# Patient Record
Sex: MALE | Race: WHITE | NOT HISPANIC OR LATINO | Employment: FULL TIME | URBAN - METROPOLITAN AREA
[De-identification: names, ages, dates, MRNs, and addresses within clinical notes are randomized per-mention and may not be internally consistent; named-entity substitution may affect disease eponyms.]

---

## 2021-02-24 ENCOUNTER — HOSPITAL ENCOUNTER (INPATIENT)
Facility: HOSPITAL | Age: 61
LOS: 1 days | Discharge: HOME | End: 2021-02-25
Attending: INTERNAL MEDICINE | Admitting: INTERNAL MEDICINE

## 2021-02-24 ENCOUNTER — APPOINTMENT (OUTPATIENT)
Dept: GENERAL RADIOLOGY | Facility: HOSPITAL | Age: 61
End: 2021-02-24

## 2021-02-24 ENCOUNTER — TRANSCRIBE ORDERS (OUTPATIENT)
Dept: CARDIOLOGY | Facility: CLINIC | Age: 61
End: 2021-02-24

## 2021-02-24 DIAGNOSIS — R94.39 ABNORMAL STRESS TEST: Primary | ICD-10-CM

## 2021-02-24 PROBLEM — K21.9 GERD (GASTROESOPHAGEAL REFLUX DISEASE): Status: ACTIVE | Noted: 2021-02-24

## 2021-02-24 PROBLEM — I20.0 UNSTABLE ANGINA (HCC): Status: ACTIVE | Noted: 2021-02-24

## 2021-02-24 PROBLEM — G47.30 SLEEP APNEA: Status: ACTIVE | Noted: 2021-02-24

## 2021-02-24 PROBLEM — I44.7 LBBB (LEFT BUNDLE BRANCH BLOCK): Status: ACTIVE | Noted: 2021-02-24

## 2021-02-24 PROBLEM — E11.9 TYPE 2 DIABETES MELLITUS: Status: ACTIVE | Noted: 2021-02-24

## 2021-02-24 PROBLEM — E78.5 HYPERLIPIDEMIA: Status: ACTIVE | Noted: 2021-02-24

## 2021-02-24 LAB
ALBUMIN SERPL-MCNC: 4.1 G/DL (ref 3.5–5.2)
ALBUMIN/GLOB SERPL: 1.6 G/DL
ALP SERPL-CCNC: 73 U/L (ref 39–117)
ALT SERPL W P-5'-P-CCNC: 37 U/L (ref 1–41)
ANION GAP SERPL CALCULATED.3IONS-SCNC: 9 MMOL/L (ref 5–15)
AST SERPL-CCNC: 25 U/L (ref 1–40)
BASOPHILS # BLD AUTO: 0.05 10*3/MM3 (ref 0–0.2)
BASOPHILS NFR BLD AUTO: 0.8 % (ref 0–1.5)
BILIRUB SERPL-MCNC: 0.7 MG/DL (ref 0–1.2)
BUN SERPL-MCNC: 19 MG/DL (ref 6–20)
BUN/CREAT SERPL: 22.6 (ref 7–25)
CALCIUM SPEC-SCNC: 10.1 MG/DL (ref 8.6–10.5)
CHLORIDE SERPL-SCNC: 98 MMOL/L (ref 98–107)
CO2 SERPL-SCNC: 28 MMOL/L (ref 22–29)
CREAT SERPL-MCNC: 0.84 MG/DL (ref 0.76–1.27)
DEPRECATED RDW RBC AUTO: 40.5 FL (ref 37–54)
EOSINOPHIL # BLD AUTO: 0.11 10*3/MM3 (ref 0–0.4)
EOSINOPHIL NFR BLD AUTO: 1.8 % (ref 0.3–6.2)
ERYTHROCYTE [DISTWIDTH] IN BLOOD BY AUTOMATED COUNT: 13 % (ref 12.3–15.4)
GFR SERPL CREATININE-BSD FRML MDRD: 114 ML/MIN/1.73
GFR SERPL CREATININE-BSD FRML MDRD: 94 ML/MIN/1.73
GLOBULIN UR ELPH-MCNC: 2.5 GM/DL
GLUCOSE SERPL-MCNC: 158 MG/DL (ref 65–99)
HCT VFR BLD AUTO: 47.4 % (ref 37.5–51)
HGB BLD-MCNC: 16.3 G/DL (ref 13–17.7)
IMM GRANULOCYTES # BLD AUTO: 0.01 10*3/MM3 (ref 0–0.05)
IMM GRANULOCYTES NFR BLD AUTO: 0.2 % (ref 0–0.5)
LYMPHOCYTES # BLD AUTO: 2.26 10*3/MM3 (ref 0.7–3.1)
LYMPHOCYTES NFR BLD AUTO: 37.9 % (ref 19.6–45.3)
MAGNESIUM SERPL-MCNC: 1.6 MG/DL (ref 1.6–2.6)
MCH RBC QN AUTO: 29.6 PG (ref 26.6–33)
MCHC RBC AUTO-ENTMCNC: 34.4 G/DL (ref 31.5–35.7)
MCV RBC AUTO: 86.2 FL (ref 79–97)
MONOCYTES # BLD AUTO: 0.65 10*3/MM3 (ref 0.1–0.9)
MONOCYTES NFR BLD AUTO: 10.9 % (ref 5–12)
NEUTROPHILS NFR BLD AUTO: 2.88 10*3/MM3 (ref 1.7–7)
NEUTROPHILS NFR BLD AUTO: 48.4 % (ref 42.7–76)
NRBC BLD AUTO-RTO: 0 /100 WBC (ref 0–0.2)
NT-PROBNP SERPL-MCNC: 63.6 PG/ML (ref 0–900)
PLATELET # BLD AUTO: 178 10*3/MM3 (ref 140–450)
PMV BLD AUTO: 11.2 FL (ref 6–12)
POTASSIUM SERPL-SCNC: 3.3 MMOL/L (ref 3.5–5.2)
PROT SERPL-MCNC: 6.6 G/DL (ref 6–8.5)
RBC # BLD AUTO: 5.5 10*6/MM3 (ref 4.14–5.8)
SODIUM SERPL-SCNC: 135 MMOL/L (ref 136–145)
TROPONIN T SERPL-MCNC: <0.01 NG/ML (ref 0–0.03)
TROPONIN T SERPL-MCNC: <0.01 NG/ML (ref 0–0.03)
UFH PPP CHRO-ACNC: 0.1 IU/ML (ref 0.3–0.7)
WBC # BLD AUTO: 5.96 10*3/MM3 (ref 3.4–10.8)

## 2021-02-24 PROCEDURE — 80053 COMPREHEN METABOLIC PANEL: CPT | Performed by: NURSE PRACTITIONER

## 2021-02-24 PROCEDURE — 71045 X-RAY EXAM CHEST 1 VIEW: CPT

## 2021-02-24 PROCEDURE — 99223 1ST HOSP IP/OBS HIGH 75: CPT | Performed by: INTERNAL MEDICINE

## 2021-02-24 PROCEDURE — 93010 ELECTROCARDIOGRAM REPORT: CPT | Performed by: INTERNAL MEDICINE

## 2021-02-24 PROCEDURE — 94660 CPAP INITIATION&MGMT: CPT

## 2021-02-24 PROCEDURE — 85025 COMPLETE CBC W/AUTO DIFF WBC: CPT | Performed by: NURSE PRACTITIONER

## 2021-02-24 PROCEDURE — 93005 ELECTROCARDIOGRAM TRACING: CPT | Performed by: NURSE PRACTITIONER

## 2021-02-24 PROCEDURE — 83735 ASSAY OF MAGNESIUM: CPT | Performed by: NURSE PRACTITIONER

## 2021-02-24 PROCEDURE — 84484 ASSAY OF TROPONIN QUANT: CPT | Performed by: NURSE PRACTITIONER

## 2021-02-24 PROCEDURE — 83880 ASSAY OF NATRIURETIC PEPTIDE: CPT | Performed by: NURSE PRACTITIONER

## 2021-02-24 PROCEDURE — 85520 HEPARIN ASSAY: CPT

## 2021-02-24 PROCEDURE — 94799 UNLISTED PULMONARY SVC/PX: CPT

## 2021-02-24 PROCEDURE — 25010000002 HEPARIN (PORCINE) PER 1000 UNITS

## 2021-02-24 RX ORDER — POTASSIUM CHLORIDE 750 MG/1
40 CAPSULE, EXTENDED RELEASE ORAL AS NEEDED
Status: DISCONTINUED | OUTPATIENT
Start: 2021-02-24 | End: 2021-02-25 | Stop reason: HOSPADM

## 2021-02-24 RX ORDER — ATORVASTATIN CALCIUM 20 MG/1
20 TABLET, FILM COATED ORAL DAILY
Status: DISCONTINUED | OUTPATIENT
Start: 2021-02-25 | End: 2021-02-25 | Stop reason: HOSPADM

## 2021-02-24 RX ORDER — MELOXICAM 15 MG/1
15 TABLET ORAL DAILY
COMMUNITY

## 2021-02-24 RX ORDER — HEPARIN SODIUM 10000 [USP'U]/100ML
12 INJECTION, SOLUTION INTRAVENOUS
Status: DISCONTINUED | OUTPATIENT
Start: 2021-02-24 | End: 2021-02-25

## 2021-02-24 RX ORDER — ACETAMINOPHEN 650 MG/1
650 SUPPOSITORY RECTAL EVERY 4 HOURS PRN
Status: DISCONTINUED | OUTPATIENT
Start: 2021-02-24 | End: 2021-02-25 | Stop reason: HOSPADM

## 2021-02-24 RX ORDER — MAGNESIUM SULFATE HEPTAHYDRATE 40 MG/ML
2 INJECTION, SOLUTION INTRAVENOUS AS NEEDED
Status: DISCONTINUED | OUTPATIENT
Start: 2021-02-24 | End: 2021-02-25 | Stop reason: HOSPADM

## 2021-02-24 RX ORDER — METOPROLOL SUCCINATE 50 MG/1
50 TABLET, EXTENDED RELEASE ORAL DAILY
COMMUNITY
End: 2023-03-22 | Stop reason: ALTCHOICE

## 2021-02-24 RX ORDER — ACETAMINOPHEN 325 MG/1
650 TABLET ORAL EVERY 4 HOURS PRN
Status: DISCONTINUED | OUTPATIENT
Start: 2021-02-24 | End: 2021-02-25 | Stop reason: HOSPADM

## 2021-02-24 RX ORDER — GLIPIZIDE 5 MG/1
5 TABLET ORAL
COMMUNITY

## 2021-02-24 RX ORDER — POTASSIUM CHLORIDE 1.5 G/1.77G
40 POWDER, FOR SOLUTION ORAL AS NEEDED
Status: DISCONTINUED | OUTPATIENT
Start: 2021-02-24 | End: 2021-02-25 | Stop reason: HOSPADM

## 2021-02-24 RX ORDER — NITROGLYCERIN 0.4 MG/1
0.4 TABLET SUBLINGUAL
Status: DISCONTINUED | OUTPATIENT
Start: 2021-02-24 | End: 2021-02-25 | Stop reason: HOSPADM

## 2021-02-24 RX ORDER — DEXTROSE MONOHYDRATE 25 G/50ML
25 INJECTION, SOLUTION INTRAVENOUS
Status: DISCONTINUED | OUTPATIENT
Start: 2021-02-24 | End: 2021-02-25 | Stop reason: HOSPADM

## 2021-02-24 RX ORDER — ACETAMINOPHEN 160 MG/5ML
650 SOLUTION ORAL EVERY 4 HOURS PRN
Status: DISCONTINUED | OUTPATIENT
Start: 2021-02-24 | End: 2021-02-25 | Stop reason: HOSPADM

## 2021-02-24 RX ORDER — POTASSIUM CHLORIDE 7.45 MG/ML
10 INJECTION INTRAVENOUS
Status: DISCONTINUED | OUTPATIENT
Start: 2021-02-24 | End: 2021-02-25 | Stop reason: HOSPADM

## 2021-02-24 RX ORDER — METOPROLOL SUCCINATE 50 MG/1
50 TABLET, EXTENDED RELEASE ORAL DAILY
Status: DISCONTINUED | OUTPATIENT
Start: 2021-02-25 | End: 2021-02-25 | Stop reason: HOSPADM

## 2021-02-24 RX ORDER — SODIUM CHLORIDE 0.9 % (FLUSH) 0.9 %
10 SYRINGE (ML) INJECTION EVERY 12 HOURS SCHEDULED
Status: DISCONTINUED | OUTPATIENT
Start: 2021-02-24 | End: 2021-02-25 | Stop reason: HOSPADM

## 2021-02-24 RX ORDER — NICOTINE POLACRILEX 4 MG
15 LOZENGE BUCCAL
Status: DISCONTINUED | OUTPATIENT
Start: 2021-02-24 | End: 2021-02-25 | Stop reason: HOSPADM

## 2021-02-24 RX ORDER — TADALAFIL 5 MG/1
5 TABLET ORAL DAILY PRN
COMMUNITY

## 2021-02-24 RX ORDER — PANTOPRAZOLE SODIUM 40 MG/1
40 TABLET, DELAYED RELEASE ORAL DAILY
Status: DISCONTINUED | OUTPATIENT
Start: 2021-02-25 | End: 2021-02-25 | Stop reason: HOSPADM

## 2021-02-24 RX ORDER — MAGNESIUM SULFATE HEPTAHYDRATE 40 MG/ML
4 INJECTION, SOLUTION INTRAVENOUS AS NEEDED
Status: DISCONTINUED | OUTPATIENT
Start: 2021-02-24 | End: 2021-02-25 | Stop reason: HOSPADM

## 2021-02-24 RX ORDER — SODIUM CHLORIDE 0.9 % (FLUSH) 0.9 %
10 SYRINGE (ML) INJECTION AS NEEDED
Status: DISCONTINUED | OUTPATIENT
Start: 2021-02-24 | End: 2021-02-25 | Stop reason: HOSPADM

## 2021-02-24 RX ORDER — HEPARIN SODIUM 1000 [USP'U]/ML
4000 INJECTION, SOLUTION INTRAVENOUS; SUBCUTANEOUS ONCE
Status: COMPLETED | OUTPATIENT
Start: 2021-02-24 | End: 2021-02-24

## 2021-02-24 RX ORDER — ATORVASTATIN CALCIUM 20 MG/1
20 TABLET, FILM COATED ORAL DAILY
COMMUNITY

## 2021-02-24 RX ORDER — OMEPRAZOLE 20 MG/1
20 CAPSULE, DELAYED RELEASE ORAL DAILY
COMMUNITY

## 2021-02-24 RX ADMIN — HEPARIN SODIUM 8 UNITS/KG/HR: 10000 INJECTION, SOLUTION INTRAVENOUS at 23:05

## 2021-02-24 RX ADMIN — Medication 10 ML: at 22:00

## 2021-02-24 RX ADMIN — HEPARIN SODIUM 4000 UNITS: 1000 INJECTION, SOLUTION INTRAVENOUS; SUBCUTANEOUS at 23:05

## 2021-02-25 ENCOUNTER — READMISSION MANAGEMENT (OUTPATIENT)
Dept: CALL CENTER | Facility: HOSPITAL | Age: 61
End: 2021-02-25

## 2021-02-25 VITALS
SYSTOLIC BLOOD PRESSURE: 140 MMHG | BODY MASS INDEX: 33.56 KG/M2 | WEIGHT: 275.57 LBS | HEIGHT: 76 IN | RESPIRATION RATE: 18 BRPM | TEMPERATURE: 98.7 F | HEART RATE: 78 BPM | OXYGEN SATURATION: 92 % | DIASTOLIC BLOOD PRESSURE: 78 MMHG

## 2021-02-25 PROBLEM — I44.7 LBBB (LEFT BUNDLE BRANCH BLOCK): Chronic | Status: ACTIVE | Noted: 2021-02-24

## 2021-02-25 PROBLEM — R94.39 ABNORMAL STRESS TEST: Status: RESOLVED | Noted: 2021-02-24 | Resolved: 2021-02-25

## 2021-02-25 PROBLEM — U07.1 COVID-19 VIRUS DETECTED: Status: RESOLVED | Noted: 2021-02-25 | Resolved: 2021-02-25

## 2021-02-25 PROBLEM — R07.9 CHEST PAIN WITH NORMAL CORONARY ANGIOGRAPHY: Status: ACTIVE | Noted: 2021-02-24

## 2021-02-25 PROBLEM — U07.1 COVID-19 VIRUS DETECTED: Status: ACTIVE | Noted: 2021-02-25

## 2021-02-25 PROBLEM — R55 SYNCOPE AND COLLAPSE: Status: ACTIVE | Noted: 2021-02-25

## 2021-02-25 PROBLEM — E11.9 TYPE 2 DIABETES MELLITUS: Chronic | Status: ACTIVE | Noted: 2021-02-24

## 2021-02-25 PROBLEM — E78.5 HYPERLIPIDEMIA LDL GOAL <70: Chronic | Status: ACTIVE | Noted: 2021-02-24

## 2021-02-25 LAB
ANION GAP SERPL CALCULATED.3IONS-SCNC: 10 MMOL/L (ref 5–15)
BASOPHILS # BLD AUTO: 0.05 10*3/MM3 (ref 0–0.2)
BASOPHILS NFR BLD AUTO: 1 % (ref 0–1.5)
BUN SERPL-MCNC: 18 MG/DL (ref 6–20)
BUN/CREAT SERPL: 20.5 (ref 7–25)
CALCIUM SPEC-SCNC: 9.9 MG/DL (ref 8.6–10.5)
CATH EF ESTIMATED: 55 %
CHLORIDE SERPL-SCNC: 98 MMOL/L (ref 98–107)
CHOLEST SERPL-MCNC: 116 MG/DL (ref 0–200)
CO2 SERPL-SCNC: 29 MMOL/L (ref 22–29)
CREAT SERPL-MCNC: 0.88 MG/DL (ref 0.76–1.27)
DEPRECATED RDW RBC AUTO: 41.1 FL (ref 37–54)
EOSINOPHIL # BLD AUTO: 0.09 10*3/MM3 (ref 0–0.4)
EOSINOPHIL NFR BLD AUTO: 1.8 % (ref 0.3–6.2)
ERYTHROCYTE [DISTWIDTH] IN BLOOD BY AUTOMATED COUNT: 13.2 % (ref 12.3–15.4)
FLUAV RNA RESP QL NAA+PROBE: NOT DETECTED
FLUBV RNA RESP QL NAA+PROBE: NOT DETECTED
GFR SERPL CREATININE-BSD FRML MDRD: 89 ML/MIN/1.73
GLUCOSE BLDC GLUCOMTR-MCNC: 154 MG/DL (ref 70–130)
GLUCOSE BLDC GLUCOMTR-MCNC: 169 MG/DL (ref 70–130)
GLUCOSE BLDC GLUCOMTR-MCNC: 169 MG/DL (ref 70–130)
GLUCOSE BLDC GLUCOMTR-MCNC: 203 MG/DL (ref 70–130)
GLUCOSE SERPL-MCNC: 193 MG/DL (ref 65–99)
HBA1C MFR BLD: 7.4 % (ref 4.8–5.6)
HCT VFR BLD AUTO: 49.8 % (ref 37.5–51)
HDLC SERPL-MCNC: 30 MG/DL (ref 40–60)
HGB BLD-MCNC: 17.2 G/DL (ref 13–17.7)
IMM GRANULOCYTES # BLD AUTO: 0.01 10*3/MM3 (ref 0–0.05)
IMM GRANULOCYTES NFR BLD AUTO: 0.2 % (ref 0–0.5)
LDLC SERPL CALC-MCNC: 47 MG/DL (ref 0–100)
LDLC/HDLC SERPL: 1.23 {RATIO}
LYMPHOCYTES # BLD AUTO: 1.6 10*3/MM3 (ref 0.7–3.1)
LYMPHOCYTES NFR BLD AUTO: 31.6 % (ref 19.6–45.3)
MAGNESIUM SERPL-MCNC: 1.6 MG/DL (ref 1.6–2.6)
MCH RBC QN AUTO: 29.9 PG (ref 26.6–33)
MCHC RBC AUTO-ENTMCNC: 34.5 G/DL (ref 31.5–35.7)
MCV RBC AUTO: 86.5 FL (ref 79–97)
MONOCYTES # BLD AUTO: 0.59 10*3/MM3 (ref 0.1–0.9)
MONOCYTES NFR BLD AUTO: 11.6 % (ref 5–12)
NEUTROPHILS NFR BLD AUTO: 2.73 10*3/MM3 (ref 1.7–7)
NEUTROPHILS NFR BLD AUTO: 53.8 % (ref 42.7–76)
NRBC BLD AUTO-RTO: 0 /100 WBC (ref 0–0.2)
PLATELET # BLD AUTO: 174 10*3/MM3 (ref 140–450)
PMV BLD AUTO: 11.5 FL (ref 6–12)
POTASSIUM SERPL-SCNC: 3.5 MMOL/L (ref 3.5–5.2)
QT INTERVAL: 456 MS
QT INTERVAL: 460 MS
QTC INTERVAL: 460 MS
QTC INTERVAL: 466 MS
RBC # BLD AUTO: 5.76 10*6/MM3 (ref 4.14–5.8)
SARS-COV-2 RNA RESP QL NAA+PROBE: DETECTED
SODIUM SERPL-SCNC: 137 MMOL/L (ref 136–145)
TRIGL SERPL-MCNC: 245 MG/DL (ref 0–150)
TROPONIN T SERPL-MCNC: <0.01 NG/ML (ref 0–0.03)
TSH SERPL DL<=0.05 MIU/L-ACNC: 1.05 UIU/ML (ref 0.27–4.2)
UFH PPP CHRO-ACNC: 0.1 IU/ML (ref 0.3–0.7)
VLDLC SERPL-MCNC: 39 MG/DL (ref 5–40)
WBC # BLD AUTO: 5.07 10*3/MM3 (ref 3.4–10.8)

## 2021-02-25 PROCEDURE — 80061 LIPID PANEL: CPT | Performed by: NURSE PRACTITIONER

## 2021-02-25 PROCEDURE — 84443 ASSAY THYROID STIM HORMONE: CPT | Performed by: NURSE PRACTITIONER

## 2021-02-25 PROCEDURE — 93005 ELECTROCARDIOGRAM TRACING: CPT | Performed by: NURSE PRACTITIONER

## 2021-02-25 PROCEDURE — 25010000002 HEPARIN (PORCINE) PER 1000 UNITS: Performed by: INTERNAL MEDICINE

## 2021-02-25 PROCEDURE — 0 IOPAMIDOL PER 1 ML: Performed by: INTERNAL MEDICINE

## 2021-02-25 PROCEDURE — B2111ZZ FLUOROSCOPY OF MULTIPLE CORONARY ARTERIES USING LOW OSMOLAR CONTRAST: ICD-10-PCS | Performed by: INTERNAL MEDICINE

## 2021-02-25 PROCEDURE — 83735 ASSAY OF MAGNESIUM: CPT | Performed by: INTERNAL MEDICINE

## 2021-02-25 PROCEDURE — 25010000002 ONDANSETRON PER 1 MG: Performed by: INTERNAL MEDICINE

## 2021-02-25 PROCEDURE — 25010000002 CEFAZOLIN 1-4 GM/50ML-% SOLUTION: Performed by: INTERNAL MEDICINE

## 2021-02-25 PROCEDURE — C1764 EVENT RECORDER, CARDIAC: HCPCS | Performed by: INTERNAL MEDICINE

## 2021-02-25 PROCEDURE — 33285 INSJ SUBQ CAR RHYTHM MNTR: CPT | Performed by: INTERNAL MEDICINE

## 2021-02-25 PROCEDURE — 25010000002 FENTANYL CITRATE (PF) 100 MCG/2ML SOLUTION: Performed by: INTERNAL MEDICINE

## 2021-02-25 PROCEDURE — 93458 L HRT ARTERY/VENTRICLE ANGIO: CPT | Performed by: INTERNAL MEDICINE

## 2021-02-25 PROCEDURE — 85520 HEPARIN ASSAY: CPT

## 2021-02-25 PROCEDURE — 99152 MOD SED SAME PHYS/QHP 5/>YRS: CPT | Performed by: INTERNAL MEDICINE

## 2021-02-25 PROCEDURE — 93010 ELECTROCARDIOGRAM REPORT: CPT | Performed by: INTERNAL MEDICINE

## 2021-02-25 PROCEDURE — 83036 HEMOGLOBIN GLYCOSYLATED A1C: CPT | Performed by: NURSE PRACTITIONER

## 2021-02-25 PROCEDURE — 99254 IP/OBS CNSLTJ NEW/EST MOD 60: CPT | Performed by: INTERNAL MEDICINE

## 2021-02-25 PROCEDURE — 99239 HOSP IP/OBS DSCHRG MGMT >30: CPT | Performed by: INTERNAL MEDICINE

## 2021-02-25 PROCEDURE — 25010000003 MAGNESIUM SULFATE 4 GM/100ML SOLUTION: Performed by: INTERNAL MEDICINE

## 2021-02-25 PROCEDURE — 25010000002 MIDAZOLAM PER 1 MG: Performed by: INTERNAL MEDICINE

## 2021-02-25 PROCEDURE — 80048 BASIC METABOLIC PNL TOTAL CA: CPT | Performed by: NURSE PRACTITIONER

## 2021-02-25 PROCEDURE — 63710000001 INSULIN LISPRO (HUMAN) PER 5 UNITS: Performed by: INTERNAL MEDICINE

## 2021-02-25 PROCEDURE — C1769 GUIDE WIRE: HCPCS | Performed by: INTERNAL MEDICINE

## 2021-02-25 PROCEDURE — 86769 SARS-COV-2 COVID-19 ANTIBODY: CPT | Performed by: INTERNAL MEDICINE

## 2021-02-25 PROCEDURE — 85025 COMPLETE CBC W/AUTO DIFF WBC: CPT | Performed by: NURSE PRACTITIONER

## 2021-02-25 PROCEDURE — 84484 ASSAY OF TROPONIN QUANT: CPT | Performed by: NURSE PRACTITIONER

## 2021-02-25 PROCEDURE — 87636 SARSCOV2 & INF A&B AMP PRB: CPT | Performed by: NURSE PRACTITIONER

## 2021-02-25 PROCEDURE — 0JH632Z INSERTION OF MONITORING DEVICE INTO CHEST SUBCUTANEOUS TISSUE AND FASCIA, PERCUTANEOUS APPROACH: ICD-10-PCS | Performed by: INTERNAL MEDICINE

## 2021-02-25 PROCEDURE — 82962 GLUCOSE BLOOD TEST: CPT

## 2021-02-25 PROCEDURE — C1894 INTRO/SHEATH, NON-LASER: HCPCS | Performed by: INTERNAL MEDICINE

## 2021-02-25 PROCEDURE — 99153 MOD SED SAME PHYS/QHP EA: CPT | Performed by: INTERNAL MEDICINE

## 2021-02-25 PROCEDURE — 4A023N7 MEASUREMENT OF CARDIAC SAMPLING AND PRESSURE, LEFT HEART, PERCUTANEOUS APPROACH: ICD-10-PCS | Performed by: INTERNAL MEDICINE

## 2021-02-25 PROCEDURE — U0004 COV-19 TEST NON-CDC HGH THRU: HCPCS | Performed by: INTERNAL MEDICINE

## 2021-02-25 DEVICE — LUX-DX™ INSERTABLE CARDIAC MONITOR
Type: IMPLANTABLE DEVICE | Status: FUNCTIONAL
Brand: LUX-DX™ INSERTABLE CARDIAC MONITOR

## 2021-02-25 RX ORDER — SODIUM CHLORIDE 9 MG/ML
INJECTION, SOLUTION INTRAVENOUS CONTINUOUS PRN
Status: COMPLETED | OUTPATIENT
Start: 2021-02-25 | End: 2021-02-25

## 2021-02-25 RX ORDER — CEFAZOLIN SODIUM 1 G/50ML
1 INJECTION, SOLUTION INTRAVENOUS ONCE
Status: COMPLETED | OUTPATIENT
Start: 2021-02-25 | End: 2021-02-25

## 2021-02-25 RX ORDER — FENTANYL CITRATE 50 UG/ML
INJECTION, SOLUTION INTRAMUSCULAR; INTRAVENOUS AS NEEDED
Status: DISCONTINUED | OUTPATIENT
Start: 2021-02-25 | End: 2021-02-25 | Stop reason: HOSPADM

## 2021-02-25 RX ORDER — HEPARIN SODIUM 1000 [USP'U]/ML
6000 INJECTION, SOLUTION INTRAVENOUS; SUBCUTANEOUS ONCE
Status: COMPLETED | OUTPATIENT
Start: 2021-02-25 | End: 2021-02-25

## 2021-02-25 RX ORDER — ONDANSETRON 2 MG/ML
INJECTION INTRAMUSCULAR; INTRAVENOUS AS NEEDED
Status: DISCONTINUED | OUTPATIENT
Start: 2021-02-25 | End: 2021-02-25 | Stop reason: HOSPADM

## 2021-02-25 RX ORDER — LIDOCAINE HYDROCHLORIDE 10 MG/ML
INJECTION, SOLUTION EPIDURAL; INFILTRATION; INTRACAUDAL; PERINEURAL AS NEEDED
Status: DISCONTINUED | OUTPATIENT
Start: 2021-02-25 | End: 2021-02-25 | Stop reason: HOSPADM

## 2021-02-25 RX ORDER — ASPIRIN 81 MG/1
81 TABLET ORAL DAILY
Status: DISCONTINUED | OUTPATIENT
Start: 2021-02-25 | End: 2021-02-25 | Stop reason: HOSPADM

## 2021-02-25 RX ORDER — MIDAZOLAM HYDROCHLORIDE 1 MG/ML
INJECTION INTRAMUSCULAR; INTRAVENOUS AS NEEDED
Status: DISCONTINUED | OUTPATIENT
Start: 2021-02-25 | End: 2021-02-25 | Stop reason: HOSPADM

## 2021-02-25 RX ADMIN — CEFAZOLIN SODIUM 1 G: 1 INJECTION, SOLUTION INTRAVENOUS at 16:30

## 2021-02-25 RX ADMIN — POTASSIUM CHLORIDE 40 MEQ: 10 CAPSULE, COATED, EXTENDED RELEASE ORAL at 11:44

## 2021-02-25 RX ADMIN — METOPROLOL SUCCINATE 50 MG: 50 TABLET, EXTENDED RELEASE ORAL at 12:16

## 2021-02-25 RX ADMIN — NITROGLYCERIN 0.4 MG: 0.4 TABLET SUBLINGUAL at 12:09

## 2021-02-25 RX ADMIN — INSULIN LISPRO 3 UNITS: 100 INJECTION, SOLUTION INTRAVENOUS; SUBCUTANEOUS at 18:20

## 2021-02-25 RX ADMIN — ASPIRIN 81 MG: 81 TABLET, COATED ORAL at 11:46

## 2021-02-25 RX ADMIN — MAGNESIUM SULFATE HEPTAHYDRATE 4 G: 40 INJECTION, SOLUTION INTRAVENOUS at 18:20

## 2021-02-25 RX ADMIN — HEPARIN SODIUM 6000 UNITS: 1000 INJECTION, SOLUTION INTRAVENOUS; SUBCUTANEOUS at 09:59

## 2021-02-26 LAB — SARS-COV-2 RNA RESP QL NAA+PROBE: NOT DETECTED

## 2021-02-26 NOTE — OUTREACH NOTE
Prep Survey      Responses   [facility] facility patient discharged from?  [facility]   Is LACE score < 7 ?  Yes   Emergency Room discharge w/ pulse ox?  No   Eligibility  Readm Mgmt   Discharge diagnosis  Unstable angina-heart cath this visit [Covid history/asymptomatic]   Does the patient have one of the following disease processes/diagnoses(primary or secondary)?  Other   Does the patient have Home health ordered?  No   Is there a DME ordered?  No   Prep survey completed?  Yes          Adilene Vela RN

## 2021-02-27 LAB — SARS-COV-2 AB SERPL QL IA: POSITIVE

## 2021-03-02 ENCOUNTER — APPOINTMENT (OUTPATIENT)
Dept: PREADMISSION TESTING | Facility: HOSPITAL | Age: 61
End: 2021-03-02

## 2021-03-06 ENCOUNTER — NURSE TRIAGE (OUTPATIENT)
Dept: CALL CENTER | Facility: HOSPITAL | Age: 61
End: 2021-03-06

## 2021-03-06 NOTE — TELEPHONE ENCOUNTER
On 02/25/2021- He has a left heart cath on 02/25/2021 at [facility].,  the hand is tingling, it is a numbness it is this am, has more pronounced bruising. He picked up his 3 year old grandson. Advice to call Cardiology, if it does not improve needs to be seen today in urgent care.     Reason for Disposition  • [1] Caller has URGENT question AND [2] triager unable to answer question    Additional Information  • Negative: Sounds like a life-threatening emergency to the triager  • Negative: Chest pain  • Negative: Difficulty breathing  • Negative: Acting confused (e.g., disoriented, slurred speech) or excessively sleepy  • Negative: Surgical incision symptoms and questions  • Negative: [1] Discomfort (pain, burning or stinging) when passing urine AND [2] male  • Negative: [1] Discomfort (pain, burning or stinging) when passing urine AND [2] female  • Negative: Constipation  • Negative: New or worsening leg (calf, thigh) pain  • Negative: New or worsening leg swelling  • Negative: Dizziness is severe, or persists > 24 hours after surgery  • Negative: Pain, redness, swelling, or pus at IV Site  • Negative: Symptoms arising from use of a urinary catheter (Gordon or Coude)  • Negative: Cast problems or questions  • Negative: Medication question  • Negative: [1] Widespread rash AND [2] bright red, sunburn-like  • Negative: [1] SEVERE headache AND [2] after spinal (epidural) anesthesia  • Negative: [1] Vomiting AND [2] persists > 4 hours  • Negative: [1] Vomiting AND [2] abdomen looks much more swollen than usual  • Negative: [1] Drinking very little AND [2] dehydration suspected (e.g., no urine > 12 hours, very dry mouth, very lightheaded)  • Negative: Patient sounds very sick or weak to the triager  • Negative: Sounds like a serious complication to the triager  • Negative: Fever > 100.4 F (38.0 C)  • Negative: [1] SEVERE post-op pain (e.g., excruciating, pain scale 8-10) AND [2] not controlled with pain  "medications    Answer Assessment - Initial Assessment Questions  1. SYMPTOM: \"What's the main symptom you're concerned about?\" (e.g., pain, fever, vomiting)      Numbness and tingle of left hand from heart cath.   2. ONSET: \"When did *No Answer*  start?\"      Today   3. SURGERY: \"What surgery was performed?\"      02/25/2021 - Heart cath   4. DATE of SURGERY: \"When was surgery performed?\"       02/25/2021   5. ANESTHESIA: \" What type of anesthesia did you have?\" (e.g., general, spinal, epidural, local)      Local   6. PAIN: \"Is there any pain?\" If so, ask: \"How bad is it?\"  (Scale 1-10; or mild, moderate, severe)      Mild   7. FEVER: \"Do you have a fever?\" If so, ask: \"What is your temperature, how was it measured, and when did it start?\"      n  8. VOMITING: \"Is there any vomiting?\" If yes, ask: \"How many times?\"      n  9. BLEEDING: \"Is there any bleeding?\" If so, ask: \"How much?\" and \"Where?\"      n  10. OTHER SYMPTOMS: \"Do you have any other symptoms?\" (e.g., drainage from wound, painful urination, constipation)        Numbness and tingle.    Protocols used: POST-OP SYMPTOMS AND QUESTIONS-ADULT-      "

## 2021-03-15 ENCOUNTER — TELEPHONE (OUTPATIENT)
Dept: CARDIOLOGY | Facility: CLINIC | Age: 61
End: 2021-03-15

## 2021-03-15 NOTE — TELEPHONE ENCOUNTER
Mr Daly has no f/u appt with our office and in discharge summary it says to f/u with Dr Redd.  Called Dr Redd's office to get [employer] home monitor readings switched to them.  Left message with .

## 2021-03-17 NOTE — TELEPHONE ENCOUNTER
Readings coming through on [employer] home monitor system.  Called Dr Redd's office again today to get these switched to them.  Spoke with nurse and she is going to try and get him enrolled in their clinic today.

## 2023-03-21 RX ORDER — LISINOPRIL 20 MG/1
TABLET ORAL
Qty: 60 TABLET | Refills: 1 | Status: SHIPPED | OUTPATIENT
Start: 2023-03-21

## 2023-03-22 ENCOUNTER — OFFICE VISIT (OUTPATIENT)
Dept: CARDIOLOGY | Facility: CLINIC | Age: 63
End: 2023-03-22
Payer: COMMERCIAL

## 2023-03-22 ENCOUNTER — TELEPHONE (OUTPATIENT)
Dept: CARDIOLOGY | Facility: CLINIC | Age: 63
End: 2023-03-22

## 2023-03-22 VITALS
DIASTOLIC BLOOD PRESSURE: 92 MMHG | HEART RATE: 82 BPM | HEIGHT: 76 IN | WEIGHT: 281 LBS | SYSTOLIC BLOOD PRESSURE: 148 MMHG | OXYGEN SATURATION: 95 % | BODY MASS INDEX: 34.22 KG/M2

## 2023-03-22 DIAGNOSIS — R06.02 SHORTNESS OF BREATH: Primary | ICD-10-CM

## 2023-03-22 DIAGNOSIS — I10 HYPERTENSION, ESSENTIAL: ICD-10-CM

## 2023-03-22 DIAGNOSIS — Z95.0 PACEMAKER: ICD-10-CM

## 2023-03-22 DIAGNOSIS — G47.33 OBSTRUCTIVE SLEEP APNEA SYNDROME: ICD-10-CM

## 2023-03-22 PROCEDURE — 93280 PM DEVICE PROGR EVAL DUAL: CPT | Performed by: INTERNAL MEDICINE

## 2023-03-22 PROCEDURE — 99214 OFFICE O/P EST MOD 30 MIN: CPT | Performed by: INTERNAL MEDICINE

## 2023-03-22 RX ORDER — SPIRONOLACTONE 25 MG/1
TABLET ORAL
COMMUNITY
Start: 2023-03-20

## 2023-03-22 RX ORDER — DILTIAZEM HYDROCHLORIDE 180 MG/1
180 CAPSULE, EXTENDED RELEASE ORAL 2 TIMES DAILY
COMMUNITY
Start: 2023-03-08

## 2023-03-22 RX ORDER — NORTRIPTYLINE HYDROCHLORIDE 25 MG/1
CAPSULE ORAL
COMMUNITY
Start: 2023-03-09

## 2023-03-22 RX ORDER — LISINOPRIL 40 MG/1
TABLET ORAL
COMMUNITY
Start: 2023-03-21 | End: 2023-03-22 | Stop reason: ALTCHOICE

## 2023-03-22 RX ORDER — ALBUTEROL SULFATE 90 UG/1
AEROSOL, METERED RESPIRATORY (INHALATION)
COMMUNITY
Start: 2023-03-20

## 2023-03-22 RX ORDER — DOXAZOSIN MESYLATE 4 MG/1
4 TABLET ORAL NIGHTLY
Qty: 30 TABLET | Refills: 11 | Status: SHIPPED | OUTPATIENT
Start: 2023-03-22

## 2023-03-22 NOTE — TELEPHONE ENCOUNTER
Please let him know I have sent in the medication for blood pressure that can also help with prostate.  There is a slight risk of low blood pressure if he takes Cialis with this.  Please let him know.  Also let him know that his pharmacy did not have any metoprolol on file and I am assuming he is not taking it.  Please verify.  He may want to bring by an updated list according to the bottles he has at home.  Thanks

## 2023-03-22 NOTE — PROGRESS NOTES
[facility] Provider Note     Date:   2023   Name: Artie Daly  :   1960  PCP: Paras Bravo MD    Chief Complaint   Patient presents with   • Sleep Apnea   • Follow-up     Pacemaker ckeck       Subjective     History of Present Illness  Artie Daly is a 62 y.o. male who presents today for regular 6-month follow-up.  He reports that he had an upper respiratory tract infection in February of this year.  Since that time he has been very short of breath.  He was told that he had crackles on exam by his primary care provider.  He is concerned about the shortness of breath.  He denies any lower extremity edema.  He has not had any chest pain.  He requests an evaluation of this.    No Known Allergies    Current Outpatient Medications:   •  albuterol sulfate  (90 Base) MCG/ACT inhaler, , Disp: , Rfl:   •  atorvastatin (LIPITOR) 20 MG tablet, Take 1 tablet by mouth Daily., Disp: , Rfl:   •  dilTIAZem (TIAZAC) 180 MG 24 hr capsule, 1 capsule 2 (Two) Times a Day., Disp: , Rfl:   •  glipizide (GLUCOTROL) 5 MG tablet, Take 1 tablet by mouth 2 (Two) Times a Day Before Meals., Disp: , Rfl:   •  lisinopril (PRINIVIL,ZESTRIL) 20 MG tablet, TAKE 1 TABLET BY MOUTH TWICE DAILY, Disp: 60 tablet, Rfl: 1  •  meloxicam (MOBIC) 15 MG tablet, Take 1 tablet by mouth Daily., Disp: , Rfl:   •  metFORMIN (GLUCOPHAGE) 1000 MG tablet, Take 1 tablet by mouth 2 (Two) Times a Day With Meals., Disp: , Rfl:   •  nortriptyline (PAMELOR) 25 MG capsule, , Disp: , Rfl:   •  omeprazole (priLOSEC) 20 MG capsule, Take 1 capsule by mouth Daily., Disp: , Rfl:   •  spironolactone (ALDACTONE) 25 MG tablet, , Disp: , Rfl:   •  tadalafil (CIALIS) 5 MG tablet, Take 1 tablet by mouth Daily As Needed for Erectile Dysfunction., Disp: , Rfl:   •  doxazosin (Cardura) 4 MG tablet, Take 1 tablet by mouth Every Night., Disp: 30 tablet, Rfl: 11    Past Medical History:   Diagnosis Date   • Acute stress reaction    •  "Carotid stenosis    • Coronary artery disease    • Diabetes mellitus (HCC)    • Elevated cholesterol    • GERD (gastroesophageal reflux disease)    • Hypertension    • LBBB (left bundle branch block)     UNSPECIFIED   • Myogenic ptosis of left eyelid    • Occlusion and stenosis of bilateral carotid arteries    • MARGARETTE (obstructive sleep apnea)     BASELINE AHI 25; ON AUTOCPAP   • Presence of cardiac pacemaker    • Pure hypercholesterolemia, unspecified    • Sleep apnea    • Syncope    • Type 2 diabetes mellitus (HCC)       Past Surgical History:   Procedure Laterality Date   • APPENDECTOMY     • CARDIAC CATHETERIZATION N/A 02/25/2021    Procedure: LEFT HEART CATH;  Surgeon: Peng Anguiano IV, MD;  Location:  [facility];  Service: Cardiovascular;  Laterality: N/A;   • CARDIAC CATHETERIZATION N/A 02/25/2021    Procedure: Coronary angiography;  Surgeon: Peng Anguiano IV, MD;  Location:  [facility] INVASIVE LOCATION;  Service: Cardiovascular;  Laterality: N/A;   • CARDIAC ELECTROPHYSIOLOGY PROCEDURE N/A 02/25/2021    Procedure: Loop insertion;  Surgeon: Peng Anguiano IV, MD;  Location:  [facility] INVASIVE LOCATION;  Service: Cardiovascular;  Laterality: N/A;   • TONSILLECTOMY  09/15/2011   • VASECTOMY  09/15/2011     Family History   Problem Relation Age of Onset   • Heart attack Sister 55     Social History     Socioeconomic History   • Marital status:    • Number of children: 3   Tobacco Use   • Smoking status: Never   • Smokeless tobacco: Never   Vaping Use   • Vaping Use: Never used   Substance and Sexual Activity   • Alcohol use: Never   • Drug use: Never   • Sexual activity: Yes       Objective     Vital Signs:  /92 (BP Location: Left arm)   Pulse 82   Ht 193 cm (76\")   Wt 127 kg (281 lb)   SpO2 95%   BMI 34.20 kg/m²   Estimated body mass index is 34.2 kg/m² as calculated from the following:    Height as of this encounter: 193 cm (76\").    " Weight as of this encounter: 127 kg (281 lb).       BMI is >= 30 and <35. (Class 1 Obesity). The following options were offered after discussion;: pharmacological intervention options      Physical Exam  Vitals reviewed.   Constitutional:       General: He is not in acute distress.     Appearance: Normal appearance.   HENT:      Head: Normocephalic and atraumatic.      Mouth/Throat:      Mouth: Mucous membranes are moist.   Eyes:      Conjunctiva/sclera: Conjunctivae normal.   Neck:      Vascular: No carotid bruit.   Cardiovascular:      Rate and Rhythm: Normal rate and regular rhythm.      Pulses: Normal pulses.      Heart sounds: Normal heart sounds. No murmur heard.  Pulmonary:      Effort: Pulmonary effort is normal. No respiratory distress.      Breath sounds: Normal breath sounds. No wheezing or rhonchi.   Abdominal:      General: Abdomen is flat.      Palpations: Abdomen is soft.   Musculoskeletal:      Cervical back: Normal range of motion and neck supple.      Right lower leg: No edema.      Left lower leg: No edema.   Skin:     General: Skin is warm and dry.      Coloration: Skin is not jaundiced.   Neurological:      General: No focal deficit present.      Mental Status: He is alert and oriented to person, place, and time. Mental status is at baseline.      GCS: GCS eye subscore is 4. GCS verbal subscore is 5. GCS motor subscore is 6.      Cranial Nerves: No cranial nerve deficit.      Motor: No weakness.      Gait: Gait normal.   Psychiatric:         Mood and Affect: Mood and affect normal. Mood is not anxious.         Speech: Speech normal.         Behavior: Behavior normal.           PAP download reviewed: Good compliance and control.          Assessment and Plan     Diagnoses and all orders for this visit:    1. Shortness of breath (Primary)  Assessment & Plan:  Offered updated echo.  Patient wants to wait because of cost.  Feels like chest x-ray is more cost effective.  He will report back if any  more symptoms or worsening symptoms.    Orders:  -     XR Chest 2 View; Future    2. Hypertension, essential  Assessment & Plan:  Slightly elevated.  Some confusion of her medications.  He feels sure he is on metoprolol but his pharmacy states they have not filled this.  Because he has some prostate issues we will send in Cardura.    Orders:  -     doxazosin (Cardura) 4 MG tablet; Take 1 tablet by mouth Every Night.  Dispense: 30 tablet; Refill: 11    3. Obstructive sleep apnea syndrome  Assessment & Plan:  Download reviewed.  Good compliance and control.      4. Pacemaker  Assessment & Plan:  Good battery life and lead function.        Recommendations: ER if symptoms increase, Exercise recommendations discussed and Report if any new/changing symptoms immediately          Follow Up  Return in about 6 months (around 9/22/2023) for PM/ICD check.  Patient was given instructions and counseling regarding his condition or for health maintenance advice. Please see specific information pulled into the AVS if appropriate.

## 2023-03-22 NOTE — ASSESSMENT & PLAN NOTE
Offered updated echo.  Patient wants to wait because of cost.  Feels like chest x-ray is more cost effective.  He will report back if any more symptoms or worsening symptoms.

## 2023-03-22 NOTE — ASSESSMENT & PLAN NOTE
Slightly elevated.  Some confusion of her medications.  He feels sure he is on metoprolol but his pharmacy states they have not filled this.  Because he has some prostate issues we will send in Cardura.

## 2023-03-23 NOTE — TELEPHONE ENCOUNTER
Called pt back and he verified that he is not taking Metoprolol.  Per 's written note told pt of new medication for his blood pressure.  Cautioned about of risk of low blood pressure if he takes Cialis.  Advised pt to bring in updated list of his medications.  He verbalizes understanding.

## 2023-04-26 ENCOUNTER — TELEPHONE (OUTPATIENT)
Dept: CARDIOLOGY | Facility: CLINIC | Age: 63
End: 2023-04-26
Payer: COMMERCIAL

## 2023-04-26 DIAGNOSIS — I10 HYPERTENSION, ESSENTIAL: ICD-10-CM

## 2023-04-26 RX ORDER — DOXAZOSIN 8 MG/1
8 TABLET ORAL NIGHTLY
Qty: 90 TABLET | Refills: 3 | Status: SHIPPED | OUTPATIENT
Start: 2023-04-26

## 2023-04-26 NOTE — TELEPHONE ENCOUNTER
Not sure if he realizes this but his kidney function is pretty off.  His triglycerides are high probably related to his sugar.  He needs to stay well-hydrated.  And really watch his blood pressure.  We may need to come off of the meloxicam as well because it can hurt his kidney function as well as omeprazole.  Spironolactone may not be a good choice for his blood pressure either.  We could increase doxazosin to 8 mg.  I will put in that order.  If Dr. Bravo has any other suggestions let me know.    Also there is an interaction between his tadalafil and doxazosin so I suggest he stop the tadalafil.  He may need to get in with us in the next few months to reassess his blood pressure or kidney function.  He could also follow this up with Dr. Bravo.

## 2023-04-27 NOTE — TELEPHONE ENCOUNTER
Spoke with pt per written orders of .  Discussed lab values and medications with pt at great length.  He relates Joanie has already sent him notice of increased dose of Doxazosin. He follows with Dr. Bravo regularly and just had labs repeated.  He will continue follow up there with labs.  Reminded of his follow up here on 10.2.2023 @ 10:00 AM.  He verbalizes understanding.

## 2023-05-22 RX ORDER — LISINOPRIL 20 MG/1
TABLET ORAL
Qty: 60 TABLET | Refills: 3 | Status: SHIPPED | OUTPATIENT
Start: 2023-05-22

## 2023-05-24 RX ORDER — TRAZODONE HYDROCHLORIDE 50 MG/1
TABLET ORAL
Qty: 90 TABLET | Refills: 0 | Status: SHIPPED | OUTPATIENT
Start: 2023-05-24

## 2023-08-21 ENCOUNTER — TELEPHONE (OUTPATIENT)
Dept: CARDIOLOGY | Facility: CLINIC | Age: 63
End: 2023-08-21
Payer: COMMERCIAL

## 2023-08-21 NOTE — TELEPHONE ENCOUNTER
Patient called and left a voicemail stating he would like to speak with someone regarding the pressures on his CPAP machines. Please call back at [phone].

## 2023-08-21 NOTE — TELEPHONE ENCOUNTER
Called pt in regards to his pressures on his PAP device.  Last seen by Dr. Rded on 3/22/2023 with download showing good compliance and control on AutoCPAP 15-20cm with Len's. Now feels like he's not getting enough pressure; no recent changes in headgear or mask.  New download scanned in to Epic.  Scheduled for follow up with  on 10/2/2023.  Please advise.

## 2023-08-23 NOTE — TELEPHONE ENCOUNTER
Called pt back today in regards to feeling like his pressure are not enough.  Gave pt instructions by REYNA Li after reviewing recent download.  He states he has been able to change the RAMP pressure and is overall doing well.  Reminded pt of scheduled follow up on 10/2/2023.  Advised pt that if he continues to have problems with pressure that we will see him sooner.  He is to let our staff know; he verbalizes understanding.

## 2023-09-13 RX ORDER — LISINOPRIL 20 MG/1
TABLET ORAL
Qty: 60 TABLET | Refills: 2 | Status: SHIPPED | OUTPATIENT
Start: 2023-09-13

## 2023-09-20 RX ORDER — DILTIAZEM HYDROCHLORIDE 180 MG/1
CAPSULE, EXTENDED RELEASE ORAL
Qty: 180 CAPSULE | Refills: 1 | Status: SHIPPED | OUTPATIENT
Start: 2023-09-20

## 2023-10-02 ENCOUNTER — OFFICE VISIT (OUTPATIENT)
Dept: CARDIOLOGY | Facility: CLINIC | Age: 63
End: 2023-10-02
Payer: COMMERCIAL

## 2023-10-02 VITALS
BODY MASS INDEX: 32.88 KG/M2 | DIASTOLIC BLOOD PRESSURE: 62 MMHG | HEART RATE: 81 BPM | HEIGHT: 76 IN | SYSTOLIC BLOOD PRESSURE: 150 MMHG | WEIGHT: 270 LBS | OXYGEN SATURATION: 93 %

## 2023-10-02 DIAGNOSIS — I10 HYPERTENSION, ESSENTIAL: ICD-10-CM

## 2023-10-02 DIAGNOSIS — Z95.0 PACEMAKER: Primary | ICD-10-CM

## 2023-10-02 DIAGNOSIS — G47.33 OBSTRUCTIVE SLEEP APNEA SYNDROME: ICD-10-CM

## 2023-10-02 RX ORDER — CLOTRIMAZOLE AND BETAMETHASONE DIPROPIONATE 10; .64 MG/G; MG/G
CREAM TOPICAL
COMMUNITY
Start: 2023-06-16

## 2023-10-02 RX ORDER — ASPIRIN 81 MG/1
81 TABLET ORAL DAILY
COMMUNITY

## 2023-10-02 RX ORDER — ATROPINE SULFATE 10 MG/ML
SOLUTION/ DROPS OPHTHALMIC
COMMUNITY
Start: 2023-06-12

## 2023-10-02 RX ORDER — DORZOLAMIDE HYDROCHLORIDE AND TIMOLOL MALEATE 20; 5 MG/ML; MG/ML
SOLUTION/ DROPS OPHTHALMIC
COMMUNITY
Start: 2023-09-12

## 2023-10-03 NOTE — PROGRESS NOTES
Cardiovascular [facility] Provider Note     Date:   10/02/2023   Name: Artie Daly  :   1960  PCP: Paras Bravo MD    Chief Complaint   Patient presents with    Pacemaker Check    Follow-up    Sleep Apnea       Subjective     History of Present Illness  Artie Daly is a 63 y.o. male who presents today for follow-up.  He reports doing well.  His mother has moved in with him.  He denies any new chest pain or shortness of breath.  He is wearing his PAP device faithfully and getting good benefit out of it.  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  Overall doing well.    Cardiovascular and sleep history  1.  Dual-chamber pacemaker [employer]: 2021 intermittent heart block and syncope.  Dependent.  In remote monitoring.  2.  Obstructive sleep apnea on PAP therapy  3.  Hypertension  4.  Diabetes  5.  Left heart cath 2021 with minimal coronary disease and normal EF  6.  CKD? -Creatinine 1.6 2023  7.  Dyslipidemia-LDL 51 2023    No Known Allergies    Current Outpatient Medications:     albuterol sulfate  (90 Base) MCG/ACT inhaler, , Disp: , Rfl:     aspirin 81 MG EC tablet, Take 1 tablet by mouth Daily., Disp: , Rfl:     atorvastatin (LIPITOR) 20 MG tablet, Take 1 tablet by mouth Daily., Disp: , Rfl:     atropine 1 % ophthalmic solution, , Disp: , Rfl:     clotrimazole-betamethasone (LOTRISONE) 1-0.05 % cream, , Disp: , Rfl:     dilTIAZem (TIAZAC) 180 MG 24 hr capsule, TAKE 1 CAPSULE BY MOUTH TWICE DAILY, Disp: 180 capsule, Rfl: 1    dorzolamide-timolol (COSOPT) 22.3-6.8 MG/ML ophthalmic solution, , Disp: , Rfl:     doxazosin (Cardura) 8 MG tablet, Take 1 tablet by mouth Every Night., Disp: 90 tablet, Rfl: 3    glipizide (GLUCOTROL) 5 MG tablet, Take 1 tablet by mouth 2 (Two) Times a Day Before Meals., Disp: , Rfl:     lisinopril (PRINIVIL,ZESTRIL) 20 MG tablet, TAKE 1 TABLET BY MOUTH TWICE  DAILY, Disp: 60 tablet, Rfl: 2    meloxicam (MOBIC) 15 MG tablet, Take 1 tablet by mouth Daily., Disp: , Rfl:     metFORMIN (GLUCOPHAGE) 1000 MG tablet, Take 1 tablet by mouth 2 (Two) Times a Day With Meals., Disp: , Rfl:     nortriptyline (PAMELOR) 25 MG capsule, , Disp: , Rfl:     omeprazole (priLOSEC) 20 MG capsule, Take 1 capsule by mouth Daily., Disp: , Rfl:     spironolactone (ALDACTONE) 25 MG tablet, , Disp: , Rfl:     traZODone (DESYREL) 50 MG tablet, TAKE 1 TABLET BY MOUTH ONCE DAILY AT BEDTIME AS NEEDED, Disp: 90 tablet, Rfl: 0    Past Medical History:   Diagnosis Date    Acute stress reaction     Carotid stenosis     Coronary artery disease     Diabetes mellitus     Elevated cholesterol     GERD (gastroesophageal reflux disease)     Hypertension     LBBB (left bundle branch block)     UNSPECIFIED    Myogenic ptosis of left eyelid     Occlusion and stenosis of bilateral carotid arteries     MARGARETTE (obstructive sleep apnea)     BASELINE AHI 25; ON AUTOCPAP    Presence of cardiac pacemaker     Pure hypercholesterolemia, unspecified     Sleep apnea     Syncope     Type 2 diabetes mellitus       Past Surgical History:   Procedure Laterality Date    APPENDECTOMY      CARDIAC CATHETERIZATION N/A 02/25/2021    Procedure: LEFT HEART CATH;  Surgeon: Peng Anguiano IV, MD;  Location:  [facility] INVAS[facility] LOC[facility];  Service: Cardiovascular;  Laterality: N/A;    CARDIAC CATHETERIZATION N/A 02/25/2021    Procedure: Coronary angiography;  Surgeon: Peng Anguiano IV, MD;  Location:  [facility] CATH INVASIVE LOCATION;  Service: Cardiovascular;  Laterality: N/A;    CARDIAC ELECTROPHYSIOLOGY PROCEDURE N/A 02/25/2021    Procedure: Loop insertion;  Surgeon: Peng Anguiano IV, MD;  Location:  [facility] CATH INVASIVE LOCATION;  Service: Cardiovascular;  Laterality: N/A;    TONSILLECTOMY  09/15/2011    VASECTOMY  09/15/2011     Family History   Problem Relation Age of Onset    Heart attack Sister 55  "    Social History     Socioeconomic History    Marital status:     Number of children: 3   Tobacco Use    Smoking status: Never     Passive exposure: Never    Smokeless tobacco: Never   Vaping Use    Vaping Use: Never used   Substance and Sexual Activity    Alcohol use: Never    Drug use: Never    Sexual activity: Yes     Partners: Female       Objective     Vital Signs:  /62 (BP Location: Left arm)   Pulse 81   Ht 193 cm (76\")   Wt 122 kg (270 lb)   SpO2 93%   BMI 32.87 kg/m²   Estimated body mass index is 32.87 kg/m² as calculated from the following:    Height as of this encounter: 193 cm (76\").    Weight as of this encounter: 122 kg (270 lb).               Physical Exam  Vitals reviewed.   Constitutional:       General: He is not in acute distress.     Appearance: Normal appearance.   HENT:      Head: Normocephalic and atraumatic.      Mouth/Throat:      Mouth: Mucous membranes are moist.   Eyes:      Conjunctiva/sclera: Conjunctivae normal.   Neck:      Vascular: No carotid bruit.   Cardiovascular:      Rate and Rhythm: Normal rate and regular rhythm.      Pulses: Normal pulses.      Heart sounds: Normal heart sounds. No murmur heard.  Pulmonary:      Effort: Pulmonary effort is normal. No respiratory distress.      Breath sounds: Normal breath sounds. No wheezing or rhonchi.   Abdominal:      General: Abdomen is flat.      Palpations: Abdomen is soft.   Musculoskeletal:      Cervical back: Normal range of motion and neck supple.      Right lower leg: No edema.      Left lower leg: No edema.   Skin:     General: Skin is warm and dry.      Coloration: Skin is not jaundiced.   Neurological:      General: No focal deficit present.      Mental Status: He is alert and oriented to person, place, and time. Mental status is at baseline.      GCS: GCS eye subscore is 4. GCS verbal subscore is 5. GCS motor subscore is 6.      Cranial Nerves: No cranial nerve deficit.      Motor: No weakness.      Gait: " Gait normal.   Psychiatric:         Mood and Affect: Mood and affect normal. Mood is not anxious.         Speech: Speech normal.         Behavior: Behavior normal.         Implantable cardiac device download reviewed: Good battery life and lead function and PAP download reviewed: Good compliance and control          Assessment and Plan     Diagnoses and all orders for this visit:    1. Pacemaker [Z95.0 (ICD-10-CM)] (Primary)  Comments:  Good battery life and lead function    2. Obstructive sleep apnea syndrome  Comments:  Wearing PAP faithfully and getting good benefit out of it.  Plan to continue PAP therapy.    3. Hypertension, essential  Comments:  Suboptimal control today.  But he states he has been sciatic nerve pain.  Better at home.  And better on repeat.        Recommendations: ER if symptoms increase and Report if any new/changing symptoms immediately              Follow Up  Return in about 6 months (around 4/2/2024) for PM/ICD check.    Mily Redd MD   10/02/2023     Please note that this explicitly excludes time spent on other separate billable services such as performing procedures or test interpretation, when applicable.    This note was created using dictation software which occasionally transcribes nonsensical phrases. Please contact the provider if any clarification is needed.

## 2023-12-20 DIAGNOSIS — I10 HYPERTENSION, ESSENTIAL: Primary | ICD-10-CM

## 2023-12-21 RX ORDER — LISINOPRIL 20 MG/1
TABLET ORAL
Qty: 60 TABLET | Refills: 1 | Status: SHIPPED | OUTPATIENT
Start: 2023-12-21 | End: 2023-12-22 | Stop reason: SDUPTHER

## 2023-12-22 RX ORDER — LISINOPRIL 20 MG/1
20 TABLET ORAL 2 TIMES DAILY
Qty: 180 TABLET | Refills: 3 | Status: SHIPPED | OUTPATIENT
Start: 2023-12-22 | End: 2024-12-16

## 2024-01-03 RX ORDER — DILTIAZEM HYDROCHLORIDE 180 MG/1
180 CAPSULE, COATED, EXTENDED RELEASE ORAL 2 TIMES DAILY
Qty: 180 CAPSULE | Refills: 0 | Status: SHIPPED | OUTPATIENT
Start: 2024-01-03

## 2024-03-21 NOTE — TELEPHONE ENCOUNTER
Failed protocol.  Last OV with Dr. Redd 10/2/2023 with follow up scheduled for 4/3/2024 with Dr. Redd. Diltiazem CD 180mg BID on active list.  Ok to refill?  Please advise.

## 2024-03-22 RX ORDER — DILTIAZEM HYDROCHLORIDE 180 MG/1
180 CAPSULE, COATED, EXTENDED RELEASE ORAL 2 TIMES DAILY
Qty: 180 CAPSULE | Refills: 0 | Status: SHIPPED | OUTPATIENT
Start: 2024-03-22

## 2024-04-03 ENCOUNTER — OFFICE VISIT (OUTPATIENT)
Dept: CARDIOLOGY | Facility: CLINIC | Age: 64
End: 2024-04-03
Payer: COMMERCIAL

## 2024-04-03 VITALS
BODY MASS INDEX: 34.1 KG/M2 | SYSTOLIC BLOOD PRESSURE: 150 MMHG | DIASTOLIC BLOOD PRESSURE: 90 MMHG | OXYGEN SATURATION: 95 % | HEART RATE: 78 BPM | WEIGHT: 280 LBS | HEIGHT: 76 IN

## 2024-04-03 DIAGNOSIS — Z95.0 PACEMAKER: ICD-10-CM

## 2024-04-03 DIAGNOSIS — I10 HYPERTENSION, ESSENTIAL: Primary | ICD-10-CM

## 2024-04-03 DIAGNOSIS — G47.33 OBSTRUCTIVE SLEEP APNEA: ICD-10-CM

## 2024-04-03 RX ORDER — DILTIAZEM HYDROCHLORIDE 420 MG/1
420 CAPSULE, EXTENDED RELEASE ORAL DAILY
Qty: 90 CAPSULE | Refills: 3 | Status: SHIPPED | OUTPATIENT
Start: 2024-04-03

## 2024-04-03 NOTE — PROGRESS NOTES
Cardiovascular and Sleep Consulting Provider Note     Date:   2024   Name: Artie Daly  :   1960  PCP: Paras Bravo MD    Chief Complaint   Patient presents with    Follow-up    Hypertension    Sleep Apnea    Pacemaker Check       Subjective     History of Present Illness  Artie Daly is a 64 y.o. male who presents today for 6-month follow-up on hypertension sleep apnea and pacemaker check.  He is overall doing well.  Continues to work.  Denies any new chest pain or shortness of breath.  Denies any lower extremity edema.  Great compliance with PAP therapy and no issues with device or equipment.    Cardiovascular and sleep history  1.  Dual-chamber pacemaker [employer]: 2021 intermittent heart block and syncope.  Dependent.  In remote monitoring.  2.  Obstructive sleep apnea on PAP therapy  3.  Hypertension  4.  Diabetes  5.  Left heart cath 2021 with minimal coronary disease and normal EF  6.  CKD? -Creatinine 1.6 2023  7.  Dyslipidemia-LDL 51 2023    No Known Allergies    Current Outpatient Medications:     albuterol sulfate  (90 Base) MCG/ACT inhaler, , Disp: , Rfl:     aspirin 81 MG EC tablet, Take 1 tablet by mouth Daily., Disp: , Rfl:     atorvastatin (LIPITOR) 20 MG tablet, Take 1 tablet by mouth Daily., Disp: , Rfl:     clotrimazole-betamethasone (LOTRISONE) 1-0.05 % cream, , Disp: , Rfl:     doxazosin (Cardura) 8 MG tablet, Take 1 tablet by mouth Every Night., Disp: 90 tablet, Rfl: 3    glipizide (GLUCOTROL) 5 MG tablet, Take 1 tablet by mouth 2 (Two) Times a Day Before Meals., Disp: , Rfl:     lisinopril (PRINIVIL,ZESTRIL) 20 MG tablet, Take 1 tablet by mouth 2 (Two) Times a Day for 360 days., Disp: 180 tablet, Rfl: 3    meloxicam (MOBIC) 15 MG tablet, Take 1 tablet by mouth Daily., Disp: , Rfl:     metFORMIN (GLUCOPHAGE) 1000 MG tablet, Take 1 tablet by mouth 2 (Two) Times a Day With Meals., Disp: , Rfl:     nortriptyline (PAMELOR) 25 MG  capsule, , Disp: , Rfl:     omeprazole (priLOSEC) 20 MG capsule, Take 1 capsule by mouth Daily., Disp: , Rfl:     spironolactone (ALDACTONE) 25 MG tablet, , Disp: , Rfl:     traZODone (DESYREL) 50 MG tablet, TAKE 1 TABLET BY MOUTH ONCE DAILY AT BEDTIME AS NEEDED, Disp: 90 tablet, Rfl: 0    dilTIAZem (TIAZAC) 420 MG 24 hr capsule, Take 1 capsule by mouth Daily., Disp: 90 capsule, Rfl: 3    Past Medical History:   Diagnosis Date    Acute stress reaction     Carotid stenosis     Coronary artery disease     Diabetes mellitus     Elevated cholesterol     GERD (gastroesophageal reflux disease)     Hypertension     LBBB (left bundle branch block)     UNSPECIFIED    Myogenic ptosis of left eyelid     Occlusion and stenosis of bilateral carotid arteries     MARGARETTE (obstructive sleep apnea)     BASELINE AHI 25; ON AUTOCPAP    Presence of cardiac pacemaker     Pure hypercholesterolemia, unspecified     Sleep apnea     Syncope     Type 2 diabetes mellitus       Past Surgical History:   Procedure Laterality Date    APPENDECTOMY      CARDIAC CATHETERIZATION N/A 02/25/2021    Procedure: LEFT HEART CATH;  Surgeon: Peng Anguiano IV, MD;  Location:  [facility];  Service: Cardiovascular;  Laterality: N/A;    CARDIAC CATHETERIZATION N/A 02/25/2021    Procedure: Coronary angiography;  Surgeon: Peng Anguiano IV, MD;  Location:  [facility];  Service: Cardiovascular;  Laterality: N/A;    CARDIAC ELECTROPHYSIOLOGY PROCEDURE N/A 02/25/2021    Procedure: Loop insertion;  Surgeon: Peng Anguiano IV, MD;  Location:  [facility];  Service: Cardiovascular;  Laterality: N/A;    TONSILLECTOMY  09/15/2011    VASECTOMY  09/15/2011     Family History   Problem Relation Age of Onset    Heart attack Sister 55     Social History     Socioeconomic History    Marital status:     Number of children: 3   Tobacco Use    Smoking status: Never     Passive exposure: Never  "   Smokeless tobacco: Never   Vaping Use    Vaping status: Never Used   Substance and Sexual Activity    Alcohol use: Never    Drug use: Never    Sexual activity: Yes     Partners: Female       Objective     Vital Signs:  /90   Pulse 78   Ht 193 cm (76\")   Wt 127 kg (280 lb)   SpO2 95%   BMI 34.08 kg/m²   Estimated body mass index is 34.08 kg/m² as calculated from the following:    Height as of this encounter: 193 cm (76\").    Weight as of this encounter: 127 kg (280 lb).         Physical Exam  Vitals reviewed.   Constitutional:       General: He is not in acute distress.     Appearance: Normal appearance.   HENT:      Head: Normocephalic and atraumatic.      Mouth/Throat:      Mouth: Mucous membranes are moist.   Eyes:      Conjunctiva/sclera: Conjunctivae normal.   Neck:      Vascular: No carotid bruit.   Cardiovascular:      Rate and Rhythm: Normal rate and regular rhythm.      Pulses: Normal pulses.      Heart sounds: Normal heart sounds. No murmur heard.  Pulmonary:      Effort: Pulmonary effort is normal. No respiratory distress.      Breath sounds: Normal breath sounds. No wheezing or rhonchi.   Abdominal:      General: Abdomen is flat.      Palpations: Abdomen is soft.   Musculoskeletal:      Cervical back: Normal range of motion and neck supple.      Right lower leg: No edema.      Left lower leg: No edema.   Skin:     General: Skin is warm and dry.      Coloration: Skin is not jaundiced.   Neurological:      General: No focal deficit present.      Mental Status: He is alert and oriented to person, place, and time. Mental status is at baseline.      GCS: GCS eye subscore is 4. GCS verbal subscore is 5. GCS motor subscore is 6.      Cranial Nerves: No cranial nerve deficit.      Motor: No weakness.      Gait: Gait normal.   Psychiatric:         Mood and Affect: Mood and affect normal. Mood is not anxious.         Speech: Speech normal.         Behavior: Behavior normal.                 "     Assessment and Plan     Diagnoses and all orders for this visit:    1. Hypertension, essential (Primary)  Comments:  Elevated e.  Increase diltiazem.  Already has a quick follow-up with his primary care provider.  Because  CKD we will leave spironolactone alone for now  Orders:  -     dilTIAZem (TIAZAC) 420 MG 24 hr capsule; Take 1 capsule by mouth Daily.  Dispense: 90 capsule; Refill: 3    2. Pacemaker  Comments:  Good battery life and lead function.    3. Obstructive sleep apnea  Comments:  Good compliance and control on PAP download.  Benefiting from PAP therapy and plan to continue.        Recommendations: Report if any new/changing symptoms immediately      Follow Up  Return in about 6 months (around 10/3/2024) for PM/ICD check.    Mily Redd MD   04/03/2024     Please note that this explicitly excludes time spent on other separate billable services such as performing procedures or test interpretation, when applicable.    This note was created using dictation software which occasionally transcribes nonsensical phrases. Please contact the provider if any clarification is needed.

## 2024-05-20 DIAGNOSIS — I10 HYPERTENSION, ESSENTIAL: ICD-10-CM

## 2024-05-20 RX ORDER — DOXAZOSIN 8 MG/1
8 TABLET ORAL
Qty: 90 TABLET | Refills: 2 | Status: SHIPPED | OUTPATIENT
Start: 2024-05-20

## 2024-10-16 ENCOUNTER — OFFICE VISIT (OUTPATIENT)
Dept: CARDIOLOGY | Facility: CLINIC | Age: 64
End: 2024-10-16
Payer: COMMERCIAL

## 2024-10-16 VITALS
HEART RATE: 75 BPM | DIASTOLIC BLOOD PRESSURE: 78 MMHG | SYSTOLIC BLOOD PRESSURE: 132 MMHG | OXYGEN SATURATION: 96 % | BODY MASS INDEX: 34.08 KG/M2 | WEIGHT: 280 LBS

## 2024-10-16 DIAGNOSIS — Z95.0 PACEMAKER: ICD-10-CM

## 2024-10-16 DIAGNOSIS — R42 DIZZINESS: Primary | ICD-10-CM

## 2024-10-16 DIAGNOSIS — I10 HYPERTENSION, ESSENTIAL: ICD-10-CM

## 2024-10-16 DIAGNOSIS — N18.31 STAGE 3A CHRONIC KIDNEY DISEASE: ICD-10-CM

## 2024-10-16 DIAGNOSIS — G47.33 OBSTRUCTIVE SLEEP APNEA: ICD-10-CM

## 2024-10-16 NOTE — ASSESSMENT & PLAN NOTE
Well controlled, continue current medications, follow up at next OV to see what it is doing and what meds renal had adjusted.

## 2024-10-16 NOTE — ASSESSMENT & PLAN NOTE
Increased max track rate to 140bpm. May help dizziness because he was dropping some heart beats. Otherwise good battery life and lead function.

## 2024-10-17 ENCOUNTER — PATIENT ROUNDING (BHMG ONLY) (OUTPATIENT)
Dept: CARDIOLOGY | Facility: CLINIC | Age: 64
End: 2024-10-17
Payer: COMMERCIAL

## 2024-10-17 NOTE — PROGRESS NOTES
..My name is Carrie Renteria and I am the Practice Manager for [facility] [place].    I would like to thank you for being a loyal patient. If you do not mind I would like to ask you a few questions about your recent visit with us.  Please feel free to reply if you wish to provide us with feedback on your first visit with our practice.    First, could you tell me what went well with your recent visit?    Secondly, we are always looking for ways to make our patients' experiences even better.  Do you have any recommendations on ways we may improve?    Finally, overall were you satisfied with your first visit to us as a [facility] facility?    In the next few days, you will be receiving a Patient Experience Survey.      Thank you for taking the time to answer a few questions today.  I hope you have a good day.

## 2024-12-18 ENCOUNTER — OFFICE VISIT (OUTPATIENT)
Dept: CARDIOLOGY | Facility: CLINIC | Age: 64
End: 2024-12-18
Payer: COMMERCIAL

## 2024-12-18 VITALS
SYSTOLIC BLOOD PRESSURE: 136 MMHG | WEIGHT: 284 LBS | DIASTOLIC BLOOD PRESSURE: 74 MMHG | HEIGHT: 76 IN | OXYGEN SATURATION: 94 % | BODY MASS INDEX: 34.58 KG/M2

## 2024-12-18 DIAGNOSIS — I10 HYPERTENSION, UNSPECIFIED TYPE: ICD-10-CM

## 2024-12-18 DIAGNOSIS — I34.0 NONRHEUMATIC MITRAL VALVE REGURGITATION: ICD-10-CM

## 2024-12-18 DIAGNOSIS — Z95.0 PACEMAKER: ICD-10-CM

## 2024-12-18 DIAGNOSIS — R42 DIZZINESS: Primary | ICD-10-CM

## 2024-12-18 NOTE — PROGRESS NOTES
Cardiovascular and Sleep Consulting Provider Note     Date:   2024   Name: Artie Daly  :   1960  PCP: Paras Bravo MD    Chief Complaint   Patient presents with    Dizziness    Hypertension     Pacemaker check after adjustment       Subjective     History of Present Illness  Artie Daly is a 64 y.o. male who presents today for follow up after pacemaker adjustment.  States that dizziness is better.No syncope.  Shortness of breath especially going up stairs Nothing unexpected  Has had left foot swelling ,reports nothing unusual. States that kidney failure for over 1 year with kidney function at 44% per nephrologist.  No reports of chest pain or palpitations.  Feels like the change helped and is what he needed.  Wears C-Pap and reports no problems with that.Gets supplies off [employer]        Cardiovascular and sleep history  1.  Dual-chamber pacemaker [employer]: 2021 intermittent heart block and syncope.  Dependent.  In remote monitoring.  2.  Obstructive sleep apnea on PAP therapy  3.  Hypertension  4.  Diabetes  5.  Left heart cath 2021 with minimal coronary disease and normal EF  6.  CKD? -Creatinine 1.6 2023  7.  Dyslipidemia-LDL 51 2024    No Known Allergies    Current Outpatient Medications:     albuterol sulfate  (90 Base) MCG/ACT inhaler, Inhale As Needed., Disp: , Rfl:     aspirin 81 MG EC tablet, Take 1 tablet by mouth Daily., Disp: , Rfl:     atorvastatin (LIPITOR) 20 MG tablet, Take 1 tablet by mouth Daily., Disp: , Rfl:     clotrimazole-betamethasone (LOTRISONE) 1-0.05 % cream, , Disp: , Rfl:     dilTIAZem (TIAZAC) 420 MG 24 hr capsule, Take 1 capsule by mouth Daily., Disp: 90 capsule, Rfl: 3    doxazosin (CARDURA) 8 MG tablet, TAKE 1 TABLET BY MOUTH AT BEDTIME, Disp: 90 tablet, Rfl: 2    glipizide (GLUCOTROL) 5 MG tablet, Take 1 tablet by mouth 2 (Two) Times a Day Before Meals., Disp: , Rfl:     lisinopril (PRINIVIL,ZESTRIL) 20 MG tablet, Take 1  tablet by mouth 2 (Two) Times a Day for 360 days., Disp: 180 tablet, Rfl: 3    metFORMIN (GLUCOPHAGE) 1000 MG tablet, Take 1 tablet by mouth 2 (Two) Times a Day With Meals., Disp: , Rfl:     nortriptyline (PAMELOR) 25 MG capsule, , Disp: , Rfl:     omeprazole (priLOSEC) 20 MG capsule, Take 1 capsule by mouth Daily., Disp: , Rfl:     traZODone (DESYREL) 50 MG tablet, TAKE 1 TABLET BY MOUTH ONCE DAILY AT BEDTIME AS NEEDED, Disp: 90 tablet, Rfl: 0    Past Medical History:   Diagnosis Date    Acute stress reaction     Carotid stenosis     Coronary artery disease     Diabetes mellitus     Elevated cholesterol     GERD (gastroesophageal reflux disease)     Hypertension     LBBB (left bundle branch block)     UNSPECIFIED    Myogenic ptosis of left eyelid     Occlusion and stenosis of bilateral carotid arteries     MARGARETTE (obstructive sleep apnea)     BASELINE AHI 25; ON AUTOCPAP    Presence of cardiac pacemaker     Pure hypercholesterolemia, unspecified     Sleep apnea     Syncope     Type 2 diabetes mellitus       Past Surgical History:   Procedure Laterality Date    APPENDECTOMY      CARDIAC CATHETERIZATION N/A 02/25/2021    Procedure: LEFT HEART CATH;  Surgeon: Peng Anguiano IV, MD;  Location:  [facility];  Service: Cardiovascular;  Laterality: N/A;    CARDIAC CATHETERIZATION N/A 02/25/2021    Procedure: Coronary angiography;  Surgeon: Peng Anguiano IV, MD;  Location:  [facility];  Service: Cardiovascular;  Laterality: N/A;    CARDIAC ELECTROPHYSIOLOGY PROCEDURE N/A 02/25/2021    Procedure: Loop insertion;  Surgeon: Peng Anguiano IV, MD;  Location:  [facility];  Service: Cardiovascular;  Laterality: N/A;    TONSILLECTOMY  09/15/2011    VASECTOMY  09/15/2011     Family History   Problem Relation Age of Onset    Heart attack Sister 55     Social History     Socioeconomic History    Marital status:     Number of children: 3  "  Tobacco Use    Smoking status: Never     Passive exposure: Never    Smokeless tobacco: Never   Vaping Use    Vaping status: Never Used   Substance and Sexual Activity    Alcohol use: Never    Drug use: Never    Sexual activity: Yes     Partners: Female       Objective     Vital Signs:  /74 (BP Location: Right arm, Patient Position: Sitting, Cuff Size: Adult)   Ht 193 cm (76\")   Wt 129 kg (284 lb)   SpO2 94%   BMI 34.57 kg/m²   Estimated body mass index is 34.57 kg/m² as calculated from the following:    Height as of this encounter: 193 cm (76\").    Weight as of this encounter: 129 kg (284 lb).         Physical Exam  Constitutional:       Appearance: Normal appearance. He is well-developed.   HENT:      Head: Normocephalic and atraumatic.   Eyes:      General: No scleral icterus.     Pupils: Pupils are equal, round, and reactive to light.   Cardiovascular:      Rate and Rhythm: Normal rate and regular rhythm.      Heart sounds: Normal heart sounds. No murmur heard.  Pulmonary:      Breath sounds: Normal breath sounds. No wheezing or rhonchi.   Musculoskeletal:      Right lower leg: No edema.      Left lower leg: No edema.   Skin:     Capillary Refill: Capillary refill takes less than 2 seconds.      Coloration: Skin is not cyanotic.      Nails: There is no clubbing.   Neurological:      Mental Status: He is alert and oriented to person, place, and time.      Motor: No weakness.      Gait: Gait normal.   Psychiatric:         Mood and Affect: Mood normal.         Behavior: Behavior is cooperative.         Thought Content: Thought content normal.         Cognition and Memory: Memory normal.             ECG 12 Lead    Date/Time: 12/18/2024 3:54 PM  Performed by: Mily Redd MD    Authorized by: Mily Redd MD  Comparison: compared with previous ECG from 8/10/2022  Similar to previous ECG  Rhythm: sinus rhythm and paced  Pacing: dual chamber pacing  Clinical impression: non-specific ECG       "     Assessment and Plan     Assessment & Plan  Dizziness  Resolved after increase in slope of rate response on PM. Doing well now. No further work up needed.        Hypertension, unspecified type  Well controlled. Continue current medications.     Orders:    ECG 12 Lead; Future    ECG 12 Lead    Nonrheumatic mitral valve regurgitation  Will be due for echo at next appointment. No new symptoms   Orders:    Adult Transthoracic Echo Complete W/ Cont if Necessary Per Protocol; Future    Pacemaker  Good battery life and lead function.                     Follow Up  Return in about 6 months (around 6/18/2025) for PM/ICD check, with testing at that appointment.    ILIANA Redd MD  Cardiology and [facility]  12/18/2024     Please note that this explicitly excludes time spent on other separate billable services such as performing procedures or test interpretation, when applicable.    This note was created using dictation software which occasionally transcribes nonsensical phrases. Please contact the provider if any clarification is needed.

## 2025-01-24 ENCOUNTER — TELEPHONE (OUTPATIENT)
Dept: CARDIOLOGY | Facility: CLINIC | Age: 65
End: 2025-01-24
Payer: COMMERCIAL

## 2025-01-24 NOTE — TELEPHONE ENCOUNTER
Pt called with complaints of low blood pressure. Reports that it is 98/50. Reported that he was dizzy and lightheded. Instructed pt that he needed to go to the emergency room. Pt reports that it is coming up now to 102. Still reports that is dizzy with this. Advised again that with symptoms pt needed to go to the ER. Pt verbalized understanding.

## 2025-01-27 ENCOUNTER — OFFICE VISIT (OUTPATIENT)
Dept: CARDIOLOGY | Facility: CLINIC | Age: 65
End: 2025-01-27
Payer: COMMERCIAL

## 2025-01-27 ENCOUNTER — TELEPHONE (OUTPATIENT)
Dept: CARDIOLOGY | Facility: CLINIC | Age: 65
End: 2025-01-27
Payer: COMMERCIAL

## 2025-01-27 VITALS
WEIGHT: 271 LBS | OXYGEN SATURATION: 97 % | SYSTOLIC BLOOD PRESSURE: 128 MMHG | DIASTOLIC BLOOD PRESSURE: 70 MMHG | HEART RATE: 82 BPM | BODY MASS INDEX: 33 KG/M2 | HEIGHT: 76 IN

## 2025-01-27 DIAGNOSIS — I10 HYPERTENSION, ESSENTIAL: Primary | ICD-10-CM

## 2025-01-27 DIAGNOSIS — N18.31 STAGE 3A CHRONIC KIDNEY DISEASE: ICD-10-CM

## 2025-01-27 PROCEDURE — 99214 OFFICE O/P EST MOD 30 MIN: CPT | Performed by: NURSE PRACTITIONER

## 2025-01-27 RX ORDER — SPIRONOLACTONE 25 MG/1
25 TABLET ORAL DAILY
COMMUNITY
Start: 2024-11-15

## 2025-01-27 NOTE — TELEPHONE ENCOUNTER
LVM FOR PT TO CALL US TO SCHEDULE A FOLLOW UP APPT WITH SOONEST APPT AVAILABLE IN THE [facility] OFFICE WITH ANY OF OUR PROVIDERS PER FELICIANO'S NOTE FROM FRIDAY 1.24.25.  HUB OK TO RELAY AND SCHEDULE THIS PLEASE.      FELICIANO: Can someone please call him on Monday and set up a follow up appointment? If I don't have one one of the nurse practitioners is OK. He's in the hospital at [place] right now with acute kidney injury, getting fluids. We will just need to adjust his blood pressure medicines.

## 2025-01-28 NOTE — PROGRESS NOTES
Cardiovascular and Sleep Consulting Provider Note     Date:   2025   Name: Artie Daly  :   1960  PCP: Paras Bravo MD    Chief Complaint   Patient presents with    Hypertension     ER follow up       Subjective     History of Present Illness  Artie Daly is a 64 y.o. male who presents today for hospital follow-up visit.  Patient presented to [facility] on 2025 with dizziness and hypotension.  He had been experiencing intermittent dizziness for several days.  While in the ER he had a near syncopal episode.  He was admitted for observation and diagnosed with dizziness, dehydration and YOLI.  All of his blood pressure medications were discontinued.  His initial creatinine was 2.9, decreased to 2.1 on day of discharge.  He reports that he is feeling much better today.  His blood pressure has been stable over the last several days.  He has been averaging 120s-130s/70s-80s.  Denies any new cardiac symptoms at this time.  He has recently been following a strict exclusion diet and has lost over 18 pounds in the last month.    Cardiovascular and sleep history  1.  Dual-chamber pacemaker [employer]: 2021 intermittent heart block and syncope.  Dependent.  In remote monitoring.  2.  Obstructive sleep apnea on PAP therapy  3.  Hypertension  4.  Diabetes  5.  Left heart cath 2021 with minimal coronary disease and normal EF  6.  CKD? -Creatinine 1.6 2023  7.  Dyslipidemia-LDL 51 2024        No Known Allergies    Current Outpatient Medications:     albuterol sulfate  (90 Base) MCG/ACT inhaler, Inhale As Needed., Disp: , Rfl:     aspirin 81 MG EC tablet, Take 1 tablet by mouth Daily., Disp: , Rfl:     atorvastatin (LIPITOR) 20 MG tablet, Take 1 tablet by mouth Daily., Disp: , Rfl:     clotrimazole-betamethasone (LOTRISONE) 1-0.05 % cream, , Disp: , Rfl:     glipizide (GLUCOTROL) 5 MG tablet, Take 1 tablet by mouth 2 (Two) Times a Day Before Meals.,  Disp: , Rfl:     metFORMIN (GLUCOPHAGE) 1000 MG tablet, Take 1 tablet by mouth 2 (Two) Times a Day With Meals., Disp: , Rfl:     omeprazole (priLOSEC) 20 MG capsule, Take 1 capsule by mouth Daily., Disp: , Rfl:     spironolactone (ALDACTONE) 25 MG tablet, Take 1 tablet by mouth Daily., Disp: , Rfl:     traZODone (DESYREL) 50 MG tablet, TAKE 1 TABLET BY MOUTH ONCE DAILY AT BEDTIME AS NEEDED, Disp: 90 tablet, Rfl: 0    dilTIAZem (TIAZAC) 420 MG 24 hr capsule, Take 1 capsule by mouth Daily. (Patient not taking: Reported on 1/27/2025), Disp: 90 capsule, Rfl: 3    doxazosin (CARDURA) 8 MG tablet, TAKE 1 TABLET BY MOUTH AT BEDTIME (Patient not taking: Reported on 1/27/2025), Disp: 90 tablet, Rfl: 2    lisinopril (PRINIVIL,ZESTRIL) 20 MG tablet, Take 1 tablet by mouth 2 (Two) Times a Day for 360 days., Disp: 180 tablet, Rfl: 3    nortriptyline (PAMELOR) 25 MG capsule, , Disp: , Rfl:     Past Medical History:   Diagnosis Date    Acute stress reaction     Carotid stenosis     Coronary artery disease     Diabetes mellitus     Elevated cholesterol     GERD (gastroesophageal reflux disease)     Hypertension     LBBB (left bundle branch block)     UNSPECIFIED    Myogenic ptosis of left eyelid     Occlusion and stenosis of bilateral carotid arteries     MARGARETTE (obstructive sleep apnea)     BASELINE AHI 25; ON AUTOCPAP    Presence of cardiac pacemaker     Pure hypercholesterolemia, unspecified     Sleep apnea     Syncope     Type 2 diabetes mellitus       Past Surgical History:   Procedure Laterality Date    APPENDECTOMY      CARDIAC CATHETERIZATION N/A 02/25/2021    Procedure: LEFT HEART CATH;  Surgeon: Peng Anguiano IV, MD;  Location:  [facility];  Service: Cardiovascular;  Laterality: N/A;    CARDIAC CATHETERIZATION N/A 02/25/2021    Procedure: Coronary angiography;  Surgeon: Peng Anguiano IV, MD;  Location:  [facility];  Service: Cardiovascular;  Laterality: N/A;     "CARDIAC ELECTROPHYSIOLOGY PROCEDURE N/A 02/25/2021    Procedure: Loop insertion;  Surgeon: Peng Anguiano IV, MD;  Location: [facility] INVASIVE LOCATION;  Service: Cardiovascular;  Laterality: N/A;    TONSILLECTOMY  09/15/2011    VASECTOMY  09/15/2011     Family History   Problem Relation Age of Onset    Heart attack Sister 55     Social History     Socioeconomic History    Marital status:     Number of children: 3   Tobacco Use    Smoking status: Never     Passive exposure: Never    Smokeless tobacco: Never   Vaping Use    Vaping status: Never Used   Substance and Sexual Activity    Alcohol use: Never    Drug use: Never    Sexual activity: Yes     Partners: Female       Objective     Vital Signs:  /70   Pulse 82   Ht 193 cm (76\")   Wt 123 kg (271 lb)   SpO2 97%   BMI 32.99 kg/m²   Estimated body mass index is 32.99 kg/m² as calculated from the following:    Height as of this encounter: 193 cm (76\").    Weight as of this encounter: 123 kg (271 lb).       BMI is >= 30 and <35. (Class 1 Obesity). The following options were offered after discussion;: exercise counseling/recommendations      Physical Exam  Vitals reviewed.   Constitutional:       Appearance: Normal appearance.   HENT:      Head: Normocephalic.   Cardiovascular:      Rate and Rhythm: Normal rate and regular rhythm.      Heart sounds: Normal heart sounds.   Pulmonary:      Effort: Pulmonary effort is normal.      Breath sounds: Normal breath sounds.   Musculoskeletal:      Right lower leg: No edema.      Left lower leg: No edema.   Skin:     General: Skin is warm and dry.      Capillary Refill: Capillary refill takes less than 2 seconds.   Neurological:      General: No focal deficit present.      Mental Status: He is alert and oriented to person, place, and time.   Psychiatric:         Mood and Affect: Mood normal.         Behavior: Behavior normal.           Recent hospitalization notes reviewed: [facility] " notes reviewed          Assessment and Plan     Diagnoses and all orders for this visit:    1. Hypertension, essential (Primary)  Assessment & Plan:  Recent hypotension and dizziness after recent weight loss.  All blood pressure medications are currently on hold after recent hospitalization.  His blood pressure has been stable over the last several days, averaging 120s-130s/70s-80s.    - No changes to current treatment plan  - Continue to monitor blood pressure closely.  May need to slowly restart BP medications.  - Will likely need to restart doxazosin first due to urinary symptoms      2. Stage 3a chronic kidney disease  Assessment & Plan:  Recent dehydration and YOLI.  Most recent creatinine 2.1, baseline creatinine 1.6  - Recheck BMP    Orders:  -     Basic Metabolic Panel; Future        Recommendations: ER if symptoms increase and Report if any new/changing symptoms immediately          Follow Up  Return in about 4 weeks (around 2/24/2025) for BP medication change .  Patient was given instructions and counseling regarding his condition or for health maintenance advice. Please see specific information pulled into the AVS if appropriate.

## 2025-02-24 ENCOUNTER — OFFICE VISIT (OUTPATIENT)
Dept: CARDIOLOGY | Facility: CLINIC | Age: 65
End: 2025-02-24
Payer: COMMERCIAL

## 2025-02-24 VITALS
WEIGHT: 273 LBS | BODY MASS INDEX: 33.24 KG/M2 | SYSTOLIC BLOOD PRESSURE: 132 MMHG | OXYGEN SATURATION: 97 % | DIASTOLIC BLOOD PRESSURE: 68 MMHG | HEIGHT: 76 IN | HEART RATE: 83 BPM

## 2025-02-24 DIAGNOSIS — I10 HYPERTENSION, ESSENTIAL: Primary | ICD-10-CM

## 2025-02-24 PROCEDURE — 99213 OFFICE O/P EST LOW 20 MIN: CPT | Performed by: NURSE PRACTITIONER

## 2025-02-24 NOTE — ASSESSMENT & PLAN NOTE
He restarted Doxazosin due to enlarged prostate and worsening urinary symptoms. BP log reviewed today which revealed an average BP of 130s/70s. His BP today is 132/68. He denies any new symptoms at this time. He feels like his blood pressure is stable and the dizziness has resolved    -Continue monitoring blood pressure closely at home  - Continue doxazosin at current dose

## 2025-02-24 NOTE — PROGRESS NOTES
[facility] Provider Note     Date:   2025   Name: Artie Daly  :   1960  PCP: Paras Bravo MD    Chief Complaint   Patient presents with    Hypertension       Subjective     History of Present Illness  Artie Daly is a 64 y.o. male who presents today for follow up on hypertension. Patient was evaluated at [facility] on 25 with dizziness and hypotension. While in the ER he had a syncopal episode and was admitted for observation and diagnosed with dizziness, dehydration and YOLI.  All of his blood pressure medications were discontinued at that time. He is here to day to follow up on blood pressure readings without medication. He restarted Doxazosin due to enlarged prostate and worsening urinary symptoms. BP log reviewed today which revealed an average BP of 130s/70s. His BP today is 132/68. He denies any new symptoms at this time. He feels like his blood pressure is stable and the dizziness has resolved. He recently established with nephrology due to YOLI during hospitalization, kidney function is improving. Overall, he is doing well today with no new symptoms at this time.     Cardiovascular and sleep history  1.  Dual-chamber pacemaker [employer]: 2021 intermittent heart block and syncope.  Dependent.  In remote monitoring.  2.  Obstructive sleep apnea on PAP therapy  3.  Hypertension  4.  Diabetes  5.  Left heart cath 2021 with minimal coronary disease and normal EF  6.  CKD? -Creatinine 1.6 2023  7.  Dyslipidemia-LDL 51 2024        No Known Allergies    Current Outpatient Medications:     doxazosin (CARDURA) 8 MG tablet, TAKE 1 TABLET BY MOUTH AT BEDTIME, Disp: 90 tablet, Rfl: 2    albuterol sulfate  (90 Base) MCG/ACT inhaler, Inhale As Needed., Disp: , Rfl:     aspirin 81 MG EC tablet, Take 1 tablet by mouth Daily., Disp: , Rfl:     atorvastatin (LIPITOR) 20 MG tablet, Take 1 tablet by mouth Daily., Disp: ,  Rfl:     clotrimazole-betamethasone (LOTRISONE) 1-0.05 % cream, , Disp: , Rfl:     glipizide (GLUCOTROL) 5 MG tablet, Take 1 tablet by mouth 2 (Two) Times a Day Before Meals., Disp: , Rfl:     metFORMIN (GLUCOPHAGE) 1000 MG tablet, Take 1 tablet by mouth 2 (Two) Times a Day With Meals., Disp: , Rfl:     omeprazole (priLOSEC) 20 MG capsule, Take 1 capsule by mouth Daily., Disp: , Rfl:     spironolactone (ALDACTONE) 25 MG tablet, Take 1 tablet by mouth Daily., Disp: , Rfl:     traZODone (DESYREL) 50 MG tablet, TAKE 1 TABLET BY MOUTH ONCE DAILY AT BEDTIME AS NEEDED, Disp: 90 tablet, Rfl: 0    Past Medical History:   Diagnosis Date    Acute stress reaction     Carotid stenosis     Coronary artery disease     Diabetes mellitus     Elevated cholesterol     GERD (gastroesophageal reflux disease)     Hypertension     LBBB (left bundle branch block)     UNSPECIFIED    Myogenic ptosis of left eyelid     Occlusion and stenosis of bilateral carotid arteries     MARGARETTE (obstructive sleep apnea)     BASELINE AHI 25; ON AUTOCPAP    Presence of cardiac pacemaker     Pure hypercholesterolemia, unspecified     Sleep apnea     Syncope     Type 2 diabetes mellitus       Past Surgical History:   Procedure Laterality Date    APPENDECTOMY      CARDIAC CATHETERIZATION N/A 02/25/2021    Procedure: LEFT HEART CATH;  Surgeon: Peng Anguiano IV, MD;  Location:  [facility];  Service: Cardiovascular;  Laterality: N/A;    CARDIAC CATHETERIZATION N/A 02/25/2021    Procedure: Coronary angiography;  Surgeon: Peng Anguiano IV, MD;  Location:  [facility];  Service: Cardiovascular;  Laterality: N/A;    CARDIAC ELECTROPHYSIOLOGY PROCEDURE N/A 02/25/2021    Procedure: Loop insertion;  Surgeon: Peng Anguiano IV, MD;  Location:  [facility] INVASIVE LOC[facility];  Service: Cardiovascular;  Laterality: N/A;    TONSILLECTOMY  09/15/2011    VASECTOMY  09/15/2011     Family History   Problem  "Relation Age of Onset    Heart attack Sister 55     Social History     Socioeconomic History    Marital status:     Number of children: 3   Tobacco Use    Smoking status: Never     Passive exposure: Never    Smokeless tobacco: Never   Vaping Use    Vaping status: Never Used   Substance and Sexual Activity    Alcohol use: Never    Drug use: Never    Sexual activity: Yes     Partners: Female       Objective     Vital Signs:  /68   Pulse 83   Ht 193 cm (76\")   Wt 124 kg (273 lb)   SpO2 97%   BMI 33.23 kg/m²   Estimated body mass index is 33.23 kg/m² as calculated from the following:    Height as of this encounter: 193 cm (76\").    Weight as of this encounter: 124 kg (273 lb).               Physical Exam  Vitals reviewed.   Constitutional:       Appearance: Normal appearance.   HENT:      Head: Normocephalic.   Cardiovascular:      Rate and Rhythm: Normal rate and regular rhythm.      Heart sounds: Normal heart sounds.   Pulmonary:      Effort: Pulmonary effort is normal.      Breath sounds: Normal breath sounds.   Musculoskeletal:      Right lower leg: No edema.      Left lower leg: No edema.   Skin:     General: Skin is warm and dry.      Capillary Refill: Capillary refill takes less than 2 seconds.   Neurological:      General: No focal deficit present.      Mental Status: He is alert and oriented to person, place, and time.   Psychiatric:         Mood and Affect: Mood normal.         Behavior: Behavior normal.                 Assessment and Plan     Diagnoses and all orders for this visit:    1. Hypertension, essential (Primary)  Assessment & Plan:  He restarted Doxazosin due to enlarged prostate and worsening urinary symptoms. BP log reviewed today which revealed an average BP of 130s/70s. His BP today is 132/68. He denies any new symptoms at this time. He feels like his blood pressure is stable and the dizziness has resolved    -Continue monitoring blood pressure closely at home  - Continue " doxazosin at current dose          Recommendations: ER if symptoms increase and Report if any new/changing symptoms immediately          Follow Up  Return for keep apt in June .  Patient was given instructions and counseling regarding his condition or for health maintenance advice. Please see specific information pulled into the AVS if appropriate.

## 2025-02-28 ENCOUNTER — TELEPHONE (OUTPATIENT)
Dept: CARDIOLOGY | Facility: CLINIC | Age: 65
End: 2025-02-28
Payer: COMMERCIAL

## 2025-02-28 NOTE — TELEPHONE ENCOUNTER
Caller: ANTONIA     Relationship: [facility]    Best call back number: [phone]    What is the best time to reach you: ANYTIME    Who are you requesting to speak with (clinical staff, provider,  specific staff member): PROVIDER    Do you know the name of the person who called: NA    What was the call regarding: FAXING OVER CC REQUEST FOR PT'S UPCOMING COLONOSCOPY ON 3.5.25 NEXT WEEK. JUST PUTTING IN A TE TO BE ON THE LOOKOUT FOR THIS. PT ONLY NEEDING TO STOP ASPIRIN MORNING OF PROCEDURE, IN WHICH HE WAS ALREADY ADVISED. IF PT ON BLOOD THINNERS, HE WILL NEED TO STOP THOSE IN ADVANCE, BUT DOESN'T LOOK LIKE PT IS ON ANY. LEFT [facility]'S NUMBER IN CASE WE NEEDED TO FOLLOW UP WITH ANY QUESTIONS OR DON'T RECEIVE THE FAX TODAY.

## 2025-02-28 NOTE — TELEPHONE ENCOUNTER
PT HAS A COLONOSCOPY SCHEDULED FOR 3/5/25.        Any new symptoms since last OV such as chest pain SOA? NO  Any worsening of edema or worsening palpitations? NO  Any major medical issues since last OV we need to know? NO  Is the patient on Eliquis, xarelto, pradaxa? NO  Is the patient on aspirin? YES  Is the patient on brilinta (ticagrelor), plavix (clopidogrel), prasugrel (effient)? NO  Is the patient on medications like mounjaro or ozempic? NO  Last EKG? 01/24/2025  Last stress test? 02/17/2021  Last echo? 12/18/2024  Last heart cath or CCTA? 02/25/2021  Last OV? 02/18/2025 W/ PM DOWNLOAD

## 2025-03-04 ENCOUNTER — TELEPHONE (OUTPATIENT)
Dept: CARDIOLOGY | Facility: CLINIC | Age: 65
End: 2025-03-04
Payer: COMMERCIAL

## 2025-03-04 NOTE — TELEPHONE ENCOUNTER
Hub staff attempted to follow warm transfer process and was unsuccessful     Caller: [facility]    Relationship to patient: Other    Best call back number: [phone]     Patient is needing: FAXED A C.C REQUEST, CHECKING ON STATUS OF THIS. PT IS HAVING PROCEDURE TOMORROW

## 2025-03-24 DIAGNOSIS — I10 HYPERTENSION, ESSENTIAL: ICD-10-CM

## 2025-03-24 RX ORDER — DOXAZOSIN 8 MG/1
8 TABLET ORAL
Qty: 90 TABLET | Refills: 1 | Status: SHIPPED | OUTPATIENT
Start: 2025-03-24

## 2025-04-17 DIAGNOSIS — N18.31 STAGE 3A CHRONIC KIDNEY DISEASE: ICD-10-CM

## 2025-05-14 ENCOUNTER — TELEPHONE (OUTPATIENT)
Dept: CARDIOLOGY | Facility: CLINIC | Age: 65
End: 2025-05-14

## 2025-05-14 NOTE — TELEPHONE ENCOUNTER
Caller: Artie Daly    Relationship: Self    Best call back number: [phone]     What is the best time to reach you: ANY     What was the call regarding:   PT REPORTS THAT THEY WERE GIVEN A CALL BUT THERE IS NO DOCUMENTATION IN THE CHART ABOUT THIS. IF THIS WAS NOT A MISTAKE, PLEASE CALL PT BACK.     PTS MOTHER WAS ON A TELEHEALTH APPT WITH THE OFFICE DURING THIS TIME, UNSURE IF THIS WAS FOR HER OR HIM     Is it okay if the provider responds through MyChart: FINE

## 2025-06-16 ENCOUNTER — OFFICE VISIT (OUTPATIENT)
Dept: CARDIOLOGY | Facility: CLINIC | Age: 65
End: 2025-06-16
Payer: COMMERCIAL

## 2025-06-16 ENCOUNTER — CLINICAL SUPPORT NO REQUIREMENTS (OUTPATIENT)
Dept: CARDIOLOGY | Facility: CLINIC | Age: 65
End: 2025-06-16
Payer: COMMERCIAL

## 2025-06-16 ENCOUNTER — PATIENT ROUNDING (BHMG ONLY) (OUTPATIENT)
Dept: CARDIOLOGY | Facility: CLINIC | Age: 65
End: 2025-06-16

## 2025-06-16 VITALS
DIASTOLIC BLOOD PRESSURE: 82 MMHG | WEIGHT: 280 LBS | HEART RATE: 84 BPM | HEIGHT: 76 IN | OXYGEN SATURATION: 95 % | SYSTOLIC BLOOD PRESSURE: 148 MMHG | BODY MASS INDEX: 34.1 KG/M2

## 2025-06-16 DIAGNOSIS — Z95.0 PACEMAKER: Primary | ICD-10-CM

## 2025-06-16 DIAGNOSIS — I10 HYPERTENSION, ESSENTIAL: Primary | ICD-10-CM

## 2025-06-16 DIAGNOSIS — Z95.0 PACEMAKER: ICD-10-CM

## 2025-06-16 DIAGNOSIS — I34.0 NONRHEUMATIC MITRAL VALVE REGURGITATION: ICD-10-CM

## 2025-06-16 DIAGNOSIS — G47.33 OBSTRUCTIVE SLEEP APNEA SYNDROME: ICD-10-CM

## 2025-06-16 PROCEDURE — 93280 PM DEVICE PROGR EVAL DUAL: CPT | Performed by: INTERNAL MEDICINE

## 2025-06-16 PROCEDURE — 99214 OFFICE O/P EST MOD 30 MIN: CPT | Performed by: INTERNAL MEDICINE

## 2025-06-16 RX ORDER — LISINOPRIL 10 MG/1
10 TABLET ORAL DAILY
Start: 2025-06-16

## 2025-06-16 RX ORDER — TADALAFIL 5 MG/1
TABLET ORAL
COMMUNITY
Start: 2025-06-12

## 2025-06-16 RX ORDER — SEMAGLUTIDE 0.68 MG/ML
INJECTION, SOLUTION SUBCUTANEOUS
COMMUNITY
Start: 2025-06-11

## 2025-06-16 NOTE — PROGRESS NOTES
Cardiovascular [facility] Sleep [facility]ing Provider Note     Date:   2025   Name: Artie Daly  :   1960  PCP: Paras Bravo MD    Chief Complaint   Patient presents with    Pacemaker Check    Results     Pt states he is here today to discuss results of echo that he had done in the office today.     Sleep Apnea     Pt states he is here today for follow up MARGARETTE. He is doing ok on current therapy. DL is in Epic.     Hypertension     Pt states he is here today for follow up HTN. He has had a hard time with BP being high and low. He stopped all BP meds. Has still having dizzy spells.        Subjective     History of Present Illness  Artie Daly is a 65 y.o. male who presents today for follow up HTN, hyperlipidemia, LBBB with pacemaker check  Had ECHO today.    States that dizziness is better as long as not taking anything for B/P. Has been off B/P meds since ER visit in January. Has tried lisinopril but was really dizzy with that.    Tried Diltiazem for 3 days in a row and B/P dropped to 112/68 and was dizzy with this pressure as well. States that B/P readings will be different everytime taken if in a row and he does not know which is right.  Log reviewed, in  there were only two abnormal.    Shortness of air, especially with exertions. Not able to exercise.  Has had some swelling. Nothing concerning.    No reports of chest pain or palpitations.    Wears C-Pap and reports no problems with that. Gets supplies from [employer].  States that is feeling more tired like something is off.  No other issues or concerns.    2025 Updated      Cardiovascular and sleep history  1.  Dual-chamber pacemaker [employer]: 2021 intermittent heart block and syncope.  Dependent.  In remote monitoring.  2.  Obstructive sleep apnea on PAP therapy  3.  Hypertension  4.  Diabetes  5.  Left heart cath 2021 with minimal coronary disease and normal EF  6.  CKD? -Creatinine 1.6 2023  7.  Dyslipidemia-LDL  51 4/22/2024    Allergies   Allergen Reactions    Lisinopril Dizziness       Current Outpatient Medications:     aspirin 81 MG EC tablet, Take 1 tablet by mouth Daily., Disp: , Rfl:     atorvastatin (LIPITOR) 20 MG tablet, Take 1 tablet by mouth Daily., Disp: , Rfl:     doxazosin (CARDURA) 8 MG tablet, TAKE 1 TABLET BY MOUTH AT BEDTIME, Disp: 90 tablet, Rfl: 1    glipizide (GLUCOTROL) 5 MG tablet, Take 1 tablet by mouth 2 (Two) Times a Day Before Meals., Disp: , Rfl:     Magnesium Oxide -Mg Supplement (Magnesium Extra Strength) 400 MG capsule, , Disp: , Rfl:     metFORMIN (GLUCOPHAGE) 1000 MG tablet, Take 1 tablet by mouth 2 (Two) Times a Day With Meals., Disp: , Rfl:     omeprazole (priLOSEC) 20 MG capsule, Take 1 capsule by mouth Daily., Disp: , Rfl:     Ozempic, 0.25 or 0.5 MG/DOSE, 2 MG/3ML solution pen-injector, , Disp: , Rfl:     Probiotic Product (PROBIOTIC DAILY PO), Take 1 tablet by mouth 2 (Two) Times a Day., Disp: , Rfl:     spironolactone (ALDACTONE) 25 MG tablet, Take 1 tablet by mouth Daily., Disp: , Rfl:     tadalafil (CIALIS) 5 MG tablet, , Disp: , Rfl:     clotrimazole-betamethasone (LOTRISONE) 1-0.05 % cream, , Disp: , Rfl:     lisinopril (PRINIVIL,ZESTRIL) 10 MG tablet, Take 1 tablet by mouth Daily., Disp: , Rfl:     traZODone (DESYREL) 50 MG tablet, TAKE 1 TABLET BY MOUTH ONCE DAILY AT BEDTIME AS NEEDED (Patient not taking: Reported on 6/16/2025), Disp: 90 tablet, Rfl: 0    Past Medical History:   Diagnosis Date    Acute stress reaction     Carotid stenosis     Coronary artery disease     Diabetes mellitus     Elevated cholesterol     GERD (gastroesophageal reflux disease)     Hypertension     LBBB (left bundle branch block)     UNSPECIFIED    Myogenic ptosis of left eyelid     Occlusion and stenosis of bilateral carotid arteries     MARGARETTE (obstructive sleep apnea)     BASELINE AHI 25; ON AUTOCPAP    Presence of cardiac pacemaker     Pure hypercholesterolemia, unspecified     Sleep apnea      "Syncope     Type 2 diabetes mellitus       Past Surgical History:   Procedure Laterality Date    APPENDECTOMY      CARDIAC CATHETERIZATION N/A 02/25/2021    Procedure: LEFT HEART CATH;  Surgeon: Peng Anguiano IV, MD;  Location:  [facility];  Service: Cardiovascular;  Laterality: N/A;    CARDIAC CATHETERIZATION N/A 02/25/2021    Procedure: Coronary angiography;  Surgeon: Peng Anguiano IV, MD;  Location:  [facility];  Service: Cardiovascular;  Laterality: N/A;    CARDIAC ELECTROPHYSIOLOGY PROCEDURE N/A 02/25/2021    Procedure: Loop insertion;  Surgeon: Peng Anguiano IV, MD;  Location:  [facility];  Service: Cardiovascular;  Laterality: N/A;    TONSILLECTOMY  09/15/2011    VASECTOMY  09/15/2011     Family History   Problem Relation Age of Onset    Heart attack Sister 55     Social History     Socioeconomic History    Marital status:     Number of children: 3   Tobacco Use    Smoking status: Never     Passive exposure: Never    Smokeless tobacco: Never   Vaping Use    Vaping status: Never Used   Substance and Sexual Activity    Alcohol use: Never    Drug use: Never    Sexual activity: Yes     Partners: Female       Objective     Vital Signs:  /82 (BP Location: Right arm, Patient Position: Sitting, Cuff Size: Large Adult)   Pulse 84   Ht 193 cm (76\")   Wt 127 kg (280 lb)   SpO2 95%   BMI 34.08 kg/m²   Estimated body mass index is 34.08 kg/m² as calculated from the following:    Height as of this encounter: 193 cm (76\").    Weight as of this encounter: 127 kg (280 lb).         Physical Exam  Constitutional:       Appearance: Normal appearance. He is well-developed.   HENT:      Head: Normocephalic and atraumatic.   Eyes:      General: No scleral icterus.     Pupils: Pupils are equal, round, and reactive to light.   Cardiovascular:      Rate and Rhythm: Normal rate and regular rhythm.      Heart sounds: Normal heart " sounds. No murmur heard.  Pulmonary:      Breath sounds: Normal breath sounds. No wheezing or rhonchi.   Musculoskeletal:      Right lower leg: No edema.      Left lower leg: No edema.   Skin:     Capillary Refill: Capillary refill takes less than 2 seconds.      Coloration: Skin is not cyanotic.      Nails: There is no clubbing.   Neurological:      Mental Status: He is alert and oriented to person, place, and time.      Motor: No weakness.      Gait: Gait normal.   Psychiatric:         Mood and Affect: Mood normal.         Behavior: Behavior is cooperative.         Thought Content: Thought content normal.         Cognition and Memory: Memory normal.                 Assessment and Plan     Diagnoses and all orders for this visit:    1. Hypertension, essential (Primary)  -     lisinopril (PRINIVIL,ZESTRIL) 10 MG tablet; Take 1 tablet by mouth Daily.    2. Nonrheumatic mitral valve regurgitation    3. Pacemaker    4. Obstructive sleep apnea syndrome      PLAN:  -pap machine DL looks good, benefiting from pap therapy and plan to continue  -mitral valve updated on echo today, no significant change  -pacemaker has good battery life and lead function  -ma need upgrade to CRTP in future, right now EF does not meet criteria  -HTN will add lower dose lisinopril and see how this does with dizziness and symptoms. BP log has some high for sure, but average 130s systolic.       Follow Up  Return in about 6 months (around 12/16/2025) for PM/ICD check, Next scheduled follow up.    ILIANA Redd MD  Cardiology and Sleep [facility]  06/16/2025     Please note that this explicitly excludes time spent on other separate billable services such as performing procedures or test interpretation, when applicable.    This note was created using dictation software which occasionally transcribes nonsensical phrases. Please contact the provider if any clarification is needed.

## 2025-06-16 NOTE — PROGRESS NOTES
..My name is Carrie Renteria and I am the Practice Manager for [facility].    I would like to thank you for being a loyal patient. If you do not mind I would like to ask you a few questions about your recent visit with us.  Please feel free to reply if you wish to provide us with feedback on your first visit with our practice.    First, could you tell me what went well with your recent visit?    Secondly, we are always looking for ways to make our patients' experiences even better.  Do you have any recommendations on ways we may improve?    Finally, overall were you satisfied with your first visit to us as a [facility] facility?    In the next few days, you will be receiving a Patient Experience Survey.      Thank you for taking the time to answer a few questions today.  I hope you have a good day.

## 2025-08-22 ENCOUNTER — TELEPHONE (OUTPATIENT)
Dept: CARDIOLOGY | Facility: CLINIC | Age: 65
End: 2025-08-22
Payer: COMMERCIAL

## 2025-08-22 DIAGNOSIS — I10 HYPERTENSION, ESSENTIAL: ICD-10-CM

## 2025-08-25 RX ORDER — LISINOPRIL 5 MG/1
5 TABLET ORAL 2 TIMES DAILY
Qty: 60 TABLET | Refills: 5
Start: 2025-08-25

## (undated) DEVICE — SKIN PREP TRAY W/CHG: Brand: MEDLINE INDUSTRIES, INC.

## (undated) DEVICE — CATH DIAG EXPO .056 FL3.5 6F 100CM

## (undated) DEVICE — MODEL BT2000 P/N 700287-012KIT CONTENTS: MANIFOLD WITH SALINE AND CONTRAST PORTS, SALINE TUBING WITH SPIKE AND HAND SYRINGE, TRANSDUCER: Brand: BT2000 AUTOMATED MANIFOLD KIT

## (undated) DEVICE — Device

## (undated) DEVICE — PK CATH CARD 10

## (undated) DEVICE — GLIDESHEATH BASIC HYDROPHILIC COATED INTRODUCER SHEATH: Brand: GLIDESHEATH

## (undated) DEVICE — MODEL AT P65, P/N 701554-001KIT CONTENTS: HAND CONTROLLER, 3-WAY HIGH-PRESSURE STOPCOCK WITH ROTATING END AND PREMIUM HIGH-PRESSURE TUBING: Brand: ANGIOTOUCH® KIT

## (undated) DEVICE — CATH DIAG EXPO M/ PK 6FR FL4/FR4 PIG 3PK

## (undated) DEVICE — ST INF PRI SMRTSTE 20DRP 2VLV 24ML 117

## (undated) DEVICE — DEV COMP RAD PRELUDESYNC 24CM

## (undated) DEVICE — ST EXT IV SMARTSITE 2VLV SP M LL 5ML IV1